# Patient Record
Sex: FEMALE | Race: WHITE | NOT HISPANIC OR LATINO | Employment: FULL TIME | URBAN - METROPOLITAN AREA
[De-identification: names, ages, dates, MRNs, and addresses within clinical notes are randomized per-mention and may not be internally consistent; named-entity substitution may affect disease eponyms.]

---

## 2017-02-28 ENCOUNTER — HOSPITAL ENCOUNTER (EMERGENCY)
Facility: HOSPITAL | Age: 27
End: 2017-03-01
Attending: EMERGENCY MEDICINE | Admitting: EMERGENCY MEDICINE
Payer: MEDICARE

## 2017-02-28 VITALS
RESPIRATION RATE: 16 BRPM | HEIGHT: 65 IN | OXYGEN SATURATION: 97 % | DIASTOLIC BLOOD PRESSURE: 66 MMHG | TEMPERATURE: 97.9 F | SYSTOLIC BLOOD PRESSURE: 104 MMHG | HEART RATE: 82 BPM

## 2017-02-28 DIAGNOSIS — F32.A DEPRESSION: Primary | ICD-10-CM

## 2017-02-28 DIAGNOSIS — R45.851 SUICIDAL IDEATION: ICD-10-CM

## 2017-02-28 LAB
ETHANOL EXG-MCNC: 0 MG/DL
HCG UR QL: NEGATIVE

## 2017-02-28 PROCEDURE — 82075 ASSAY OF BREATH ETHANOL: CPT | Performed by: EMERGENCY MEDICINE

## 2017-02-28 PROCEDURE — 80307 DRUG TEST PRSMV CHEM ANLYZR: CPT | Performed by: EMERGENCY MEDICINE

## 2017-02-28 PROCEDURE — 81025 URINE PREGNANCY TEST: CPT | Performed by: EMERGENCY MEDICINE

## 2017-03-01 PROCEDURE — 99285 EMERGENCY DEPT VISIT HI MDM: CPT

## 2017-03-01 RX ORDER — LORAZEPAM 0.5 MG/1
1 TABLET ORAL ONCE
Status: COMPLETED | OUTPATIENT
Start: 2017-03-01 | End: 2017-03-01

## 2017-03-01 RX ADMIN — LORAZEPAM 1 MG: 0.5 TABLET ORAL at 00:21

## 2017-03-03 LAB
AMPHETAMINES UR QL SCN: NEGATIVE NG/ML
BARBITURATES UR QL SCN: NEGATIVE NG/ML
BENZODIAZ UR QL SCN: NEGATIVE NG/ML
BZE UR QL SCN: NEGATIVE NG/ML
CANNABINOIDS UR QL SCN: NEGATIVE NG/ML
METHADONE UR QL SCN: NEGATIVE NG/ML
OPIATES UR QL: POSITIVE
PCP UR QL: NEGATIVE NG/ML
PROPOXYPH UR QL: NEGATIVE NG/ML

## 2017-11-06 ENCOUNTER — APPOINTMENT (EMERGENCY)
Dept: CT IMAGING | Facility: HOSPITAL | Age: 27
End: 2017-11-06

## 2017-11-06 ENCOUNTER — HOSPITAL ENCOUNTER (OUTPATIENT)
Facility: HOSPITAL | Age: 27
Setting detail: OBSERVATION
Discharge: HOME | End: 2017-11-07
Attending: EMERGENCY MEDICINE | Admitting: SURGERY

## 2017-11-06 ENCOUNTER — APPOINTMENT (OUTPATIENT)
Dept: RADIOLOGY | Facility: HOSPITAL | Age: 27
End: 2017-11-06
Attending: SURGERY

## 2017-11-06 DIAGNOSIS — I95.9 TRANSIENT HYPOTENSION: ICD-10-CM

## 2017-11-06 DIAGNOSIS — R10.11 COLICKY RUQ ABDOMINAL PAIN: ICD-10-CM

## 2017-11-06 DIAGNOSIS — D72.829 LEUKOCYTOSIS: ICD-10-CM

## 2017-11-06 DIAGNOSIS — K81.0 ACUTE CHOLECYSTITIS: Primary | ICD-10-CM

## 2017-11-06 LAB
ALBUMIN SERPL BCP-MCNC: 3.7 G/DL (ref 3.5–5)
ALP SERPL-CCNC: 32 U/L (ref 46–116)
ALT SERPL W P-5'-P-CCNC: 11 U/L (ref 12–78)
ANION GAP SERPL CALCULATED.3IONS-SCNC: 10 MMOL/L (ref 4–13)
AST SERPL W P-5'-P-CCNC: 9 U/L (ref 5–45)
BASOPHILS # BLD AUTO: 0.04 THOUSANDS/ΜL (ref 0–0.1)
BASOPHILS NFR BLD AUTO: 0 % (ref 0–1)
BILIRUB SERPL-MCNC: 0.3 MG/DL (ref 0.2–1)
BUN SERPL-MCNC: 13 MG/DL (ref 5–25)
CALCIUM SERPL-MCNC: 8.5 MG/DL (ref 8.3–10.1)
CHLORIDE SERPL-SCNC: 105 MMOL/L (ref 100–108)
CLARITY, POC: CLEAR
CO2 SERPL-SCNC: 26 MMOL/L (ref 21–32)
COLOR, POC: YELLOW
CREAT SERPL-MCNC: 0.75 MG/DL (ref 0.6–1.3)
EOSINOPHIL # BLD AUTO: 0.78 THOUSAND/ΜL (ref 0–0.61)
EOSINOPHIL NFR BLD AUTO: 7 % (ref 0–6)
ERYTHROCYTE [DISTWIDTH] IN BLOOD BY AUTOMATED COUNT: 13.9 % (ref 11.6–15.1)
EXT BILIRUBIN, UA: NEGATIVE
EXT BLOOD URINE: NEGATIVE
EXT GLUCOSE, UA: NEGATIVE
EXT KETONES: NEGATIVE
EXT NITRITE, UA: NEGATIVE
EXT PH, UA: 6
EXT PREG TEST URINE: NEGATIVE
EXT PROTEIN, UA: NORMAL
EXT SPECIFIC GRAVITY, UA: 1.02
EXT UROBILINOGEN: 0.2
GFR SERPL CREATININE-BSD FRML MDRD: 109 ML/MIN/1.73SQ M
GLUCOSE SERPL-MCNC: 94 MG/DL (ref 65–140)
HCT VFR BLD AUTO: 38.2 % (ref 34.8–46.1)
HGB BLD-MCNC: 12.4 G/DL (ref 11.5–15.4)
LIPASE SERPL-CCNC: 90 U/L (ref 73–393)
LYMPHOCYTES # BLD AUTO: 2.74 THOUSANDS/ΜL (ref 0.6–4.47)
LYMPHOCYTES NFR BLD AUTO: 23 % (ref 14–44)
MCH RBC QN AUTO: 30 PG (ref 26.8–34.3)
MCHC RBC AUTO-ENTMCNC: 32.5 G/DL (ref 31.4–37.4)
MCV RBC AUTO: 93 FL (ref 82–98)
MONOCYTES # BLD AUTO: 0.8 THOUSAND/ΜL (ref 0.17–1.22)
MONOCYTES NFR BLD AUTO: 7 % (ref 4–12)
NEUTROPHILS # BLD AUTO: 7.34 THOUSANDS/ΜL (ref 1.85–7.62)
NEUTS SEG NFR BLD AUTO: 63 % (ref 43–75)
PLATELET # BLD AUTO: 345 THOUSANDS/UL (ref 149–390)
PMV BLD AUTO: 11.2 FL (ref 8.9–12.7)
POTASSIUM SERPL-SCNC: 3.6 MMOL/L (ref 3.5–5.3)
PROT SERPL-MCNC: 6.8 G/DL (ref 6.4–8.2)
RBC # BLD AUTO: 4.13 MILLION/UL (ref 3.81–5.12)
SODIUM SERPL-SCNC: 141 MMOL/L (ref 136–145)
WBC # BLD AUTO: 11.7 THOUSAND/UL (ref 4.31–10.16)
WBC # BLD EST: NEGATIVE 10*3/UL

## 2017-11-06 PROCEDURE — 81025 URINE PREGNANCY TEST: CPT | Performed by: EMERGENCY MEDICINE

## 2017-11-06 PROCEDURE — 85025 COMPLETE CBC W/AUTO DIFF WBC: CPT | Performed by: EMERGENCY MEDICINE

## 2017-11-06 PROCEDURE — 99285 EMERGENCY DEPT VISIT HI MDM: CPT

## 2017-11-06 PROCEDURE — C1769 GUIDE WIRE: HCPCS

## 2017-11-06 PROCEDURE — 36415 COLL VENOUS BLD VENIPUNCTURE: CPT | Performed by: EMERGENCY MEDICINE

## 2017-11-06 PROCEDURE — 47490 INCISION OF GALLBLADDER: CPT

## 2017-11-06 PROCEDURE — C1729 CATH, DRAINAGE: HCPCS

## 2017-11-06 PROCEDURE — 96361 HYDRATE IV INFUSION ADD-ON: CPT

## 2017-11-06 PROCEDURE — 74177 CT ABD & PELVIS W/CONTRAST: CPT

## 2017-11-06 PROCEDURE — 83690 ASSAY OF LIPASE: CPT | Performed by: EMERGENCY MEDICINE

## 2017-11-06 PROCEDURE — 87205 SMEAR GRAM STAIN: CPT | Performed by: SURGERY

## 2017-11-06 PROCEDURE — 96375 TX/PRO/DX INJ NEW DRUG ADDON: CPT

## 2017-11-06 PROCEDURE — 96374 THER/PROPH/DIAG INJ IV PUSH: CPT

## 2017-11-06 PROCEDURE — 87070 CULTURE OTHR SPECIMN AEROBIC: CPT | Performed by: SURGERY

## 2017-11-06 PROCEDURE — 81002 URINALYSIS NONAUTO W/O SCOPE: CPT | Performed by: EMERGENCY MEDICINE

## 2017-11-06 PROCEDURE — 80053 COMPREHEN METABOLIC PANEL: CPT | Performed by: EMERGENCY MEDICINE

## 2017-11-06 PROCEDURE — C9113 INJ PANTOPRAZOLE SODIUM, VIA: HCPCS | Performed by: SURGERY

## 2017-11-06 RX ORDER — ONDANSETRON 2 MG/ML
4 INJECTION INTRAMUSCULAR; INTRAVENOUS ONCE
Status: COMPLETED | OUTPATIENT
Start: 2017-11-06 | End: 2017-11-06

## 2017-11-06 RX ORDER — DEXTROSE AND SODIUM CHLORIDE 5; .45 G/100ML; G/100ML
125 INJECTION, SOLUTION INTRAVENOUS CONTINUOUS
Status: DISCONTINUED | OUTPATIENT
Start: 2017-11-06 | End: 2017-11-07

## 2017-11-06 RX ORDER — KETOROLAC TROMETHAMINE 30 MG/ML
30 INJECTION, SOLUTION INTRAMUSCULAR; INTRAVENOUS ONCE
Status: COMPLETED | OUTPATIENT
Start: 2017-11-06 | End: 2017-11-06

## 2017-11-06 RX ORDER — ONDANSETRON 2 MG/ML
4 INJECTION INTRAMUSCULAR; INTRAVENOUS EVERY 4 HOURS PRN
Status: DISCONTINUED | OUTPATIENT
Start: 2017-11-06 | End: 2017-11-06

## 2017-11-06 RX ORDER — ONDANSETRON 2 MG/ML
4 INJECTION INTRAMUSCULAR; INTRAVENOUS EVERY 4 HOURS PRN
Status: DISCONTINUED | OUTPATIENT
Start: 2017-11-06 | End: 2017-11-07 | Stop reason: HOSPADM

## 2017-11-06 RX ORDER — LEVOFLOXACIN 5 MG/ML
750 INJECTION, SOLUTION INTRAVENOUS EVERY 24 HOURS
Status: DISCONTINUED | OUTPATIENT
Start: 2017-11-06 | End: 2017-11-07 | Stop reason: HOSPADM

## 2017-11-06 RX ORDER — MORPHINE SULFATE 2 MG/ML
2 INJECTION, SOLUTION INTRAMUSCULAR; INTRAVENOUS EVERY 2 HOUR PRN
Status: DISCONTINUED | OUTPATIENT
Start: 2017-11-06 | End: 2017-11-07 | Stop reason: HOSPADM

## 2017-11-06 RX ORDER — DIPHENHYDRAMINE HYDROCHLORIDE 50 MG/ML
INJECTION INTRAMUSCULAR; INTRAVENOUS CODE/TRAUMA/SEDATION MEDICATION
Status: COMPLETED | OUTPATIENT
Start: 2017-11-06 | End: 2017-11-06

## 2017-11-06 RX ORDER — ONDANSETRON 2 MG/ML
4 INJECTION INTRAMUSCULAR; INTRAVENOUS ONCE AS NEEDED
Status: DISCONTINUED | OUTPATIENT
Start: 2017-11-06 | End: 2017-11-07 | Stop reason: HOSPADM

## 2017-11-06 RX ORDER — NICOTINE 21 MG/24HR
14 PATCH, TRANSDERMAL 24 HOURS TRANSDERMAL DAILY
Status: DISCONTINUED | OUTPATIENT
Start: 2017-11-06 | End: 2017-11-07 | Stop reason: HOSPADM

## 2017-11-06 RX ORDER — SODIUM CHLORIDE 9 MG/ML
125 INJECTION, SOLUTION INTRAVENOUS CONTINUOUS
Status: DISCONTINUED | OUTPATIENT
Start: 2017-11-06 | End: 2017-11-07

## 2017-11-06 RX ORDER — PANTOPRAZOLE SODIUM 40 MG/1
40 INJECTION, POWDER, FOR SOLUTION INTRAVENOUS DAILY
Status: DISCONTINUED | OUTPATIENT
Start: 2017-11-06 | End: 2017-11-07 | Stop reason: HOSPADM

## 2017-11-06 RX ORDER — KETOROLAC TROMETHAMINE 30 MG/ML
15 INJECTION, SOLUTION INTRAMUSCULAR; INTRAVENOUS EVERY 6 HOURS SCHEDULED
Status: DISCONTINUED | OUTPATIENT
Start: 2017-11-06 | End: 2017-11-07 | Stop reason: HOSPADM

## 2017-11-06 RX ORDER — CLINDAMYCIN PHOSPHATE 600 MG/50ML
600 INJECTION INTRAVENOUS EVERY 8 HOURS
Status: DISCONTINUED | OUTPATIENT
Start: 2017-11-06 | End: 2017-11-06

## 2017-11-06 RX ORDER — KETOROLAC TROMETHAMINE 30 MG/ML
INJECTION, SOLUTION INTRAMUSCULAR; INTRAVENOUS CODE/TRAUMA/SEDATION MEDICATION
Status: COMPLETED | OUTPATIENT
Start: 2017-11-06 | End: 2017-11-06

## 2017-11-06 RX ADMIN — METRONIDAZOLE 500 MG: 500 INJECTION, SOLUTION INTRAVENOUS at 17:24

## 2017-11-06 RX ADMIN — CEFAZOLIN SODIUM 1000 MG: 1 SOLUTION INTRAVENOUS at 04:10

## 2017-11-06 RX ADMIN — SODIUM CHLORIDE 1000 ML: 0.9 INJECTION, SOLUTION INTRAVENOUS at 01:12

## 2017-11-06 RX ADMIN — IOHEXOL 100 ML: 350 INJECTION, SOLUTION INTRAVENOUS at 02:15

## 2017-11-06 RX ADMIN — SODIUM CHLORIDE 1000 ML: 0.9 INJECTION, SOLUTION INTRAVENOUS at 02:15

## 2017-11-06 RX ADMIN — IOHEXOL 100 ML: 350 INJECTION, SOLUTION INTRAVENOUS at 03:39

## 2017-11-06 RX ADMIN — PANTOPRAZOLE SODIUM 40 MG: 40 INJECTION, POWDER, FOR SOLUTION INTRAVENOUS at 09:55

## 2017-11-06 RX ADMIN — DIPHENHYDRAMINE HYDROCHLORIDE 50 MG: 50 INJECTION INTRAMUSCULAR; INTRAVENOUS at 11:42

## 2017-11-06 RX ADMIN — SODIUM CHLORIDE 1000 ML: 0.9 INJECTION, SOLUTION INTRAVENOUS at 07:27

## 2017-11-06 RX ADMIN — KETOROLAC TROMETHAMINE 15 MG: 30 INJECTION, SOLUTION INTRAMUSCULAR at 17:27

## 2017-11-06 RX ADMIN — SODIUM CHLORIDE 125 ML/HR: 0.9 INJECTION, SOLUTION INTRAVENOUS at 04:12

## 2017-11-06 RX ADMIN — LEVOFLOXACIN 750 MG: 5 INJECTION, SOLUTION INTRAVENOUS at 12:30

## 2017-11-06 RX ADMIN — NICOTINE 14 MG: 14 PATCH TRANSDERMAL at 09:55

## 2017-11-06 RX ADMIN — IOHEXOL 5 ML: 300 INJECTION, SOLUTION INTRAVENOUS at 12:28

## 2017-11-06 RX ADMIN — KETOROLAC TROMETHAMINE 15 MG: 30 INJECTION, SOLUTION INTRAMUSCULAR at 12:06

## 2017-11-06 RX ADMIN — ONDANSETRON 4 MG: 2 INJECTION INTRAMUSCULAR; INTRAVENOUS at 01:39

## 2017-11-06 RX ADMIN — DEXTROSE AND SODIUM CHLORIDE 125 ML/HR: 5; 450 INJECTION, SOLUTION INTRAVENOUS at 09:07

## 2017-11-06 RX ADMIN — METRONIDAZOLE 500 MG: 500 INJECTION, SOLUTION INTRAVENOUS at 09:55

## 2017-11-06 RX ADMIN — ONDANSETRON 4 MG: 2 INJECTION INTRAMUSCULAR; INTRAVENOUS at 01:12

## 2017-11-06 RX ADMIN — FAMOTIDINE 20 MG: 10 INJECTION INTRAVENOUS at 01:13

## 2017-11-06 RX ADMIN — KETOROLAC TROMETHAMINE 30 MG: 30 INJECTION, SOLUTION INTRAMUSCULAR at 01:13

## 2017-11-06 NOTE — PLAN OF CARE
INFECTION - ADULT     Absence or prevention of progression during hospitalization Progressing     Absence of fever/infection during neutropenic period Progressing        PAIN - ADULT     Verbalizes/displays adequate comfort level or baseline comfort level Progressing

## 2017-11-06 NOTE — ED PROVIDER NOTES
History  Chief Complaint   Patient presents with    Abdominal Pain     Pt to ED with abdominal pain bilaterally  Vomit x 3 today, normal bowel movements, urgency to urinate, small amounts coming out  27 yo female who presents with upper abdominal colicky pain on and off for 2 weeks  No correlation with food  Denies fever/chills, no diarrhea or constipation  Does report episodes of worsening pain yesterday and today with associated nausea and vomiting  History provided by:  Patient and parent   used: No    Abdominal Pain   Pain location:  RUQ and LUQ  Pain quality: aching    Pain radiates to:  Back  Pain severity:  Moderate  Onset quality:  Gradual  Duration:  2 weeks  Timing:  Intermittent  Progression:  Worsening  Chronicity:  New  Relieved by:  Nothing  Worsened by:  Nothing  Ineffective treatments:  None tried  Associated symptoms: nausea (yesterday and today with severe pain) and vomiting (yesterday and today with severe pain)    Associated symptoms: no chest pain, no chills, no diarrhea, no dysuria, no fatigue, no fever, no hematuria, no shortness of breath, no sore throat, no vaginal bleeding and no vaginal discharge    Associated symptoms comment:  Some urinary frequency   Risk factors: has not had multiple surgeries        Prior to Admission Medications   Prescriptions Last Dose Informant Patient Reported? Taking? Naltrexone (VIVITROL IM)   Yes Yes   Sig: Inject 300 mg into the shoulder, thigh, or buttocks      Facility-Administered Medications: None       Past Medical History:   Diagnosis Date    Disease of thyroid gland     hypoactive    Hepatitis C     Sciatica        Past Surgical History:   Procedure Laterality Date    KNEE ARTHROSCOPY W/ ACL RECONSTRUCTION Right        History reviewed  No pertinent family history  I have reviewed and agree with the history as documented      Social History   Substance Use Topics    Smoking status: Current Every Day Smoker  Smokeless tobacco: Never Used    Alcohol use No        Review of Systems   Constitutional: Negative for appetite change, chills, fatigue and fever  HENT: Negative for congestion and sore throat  Eyes: Negative for visual disturbance  Respiratory: Negative for shortness of breath  Cardiovascular: Negative for chest pain  Gastrointestinal: Positive for abdominal pain (upper abd R>L ), nausea (yesterday and today with severe pain) and vomiting (yesterday and today with severe pain)  Negative for diarrhea  Genitourinary: Positive for frequency  Negative for dysuria, hematuria, vaginal bleeding and vaginal discharge  Musculoskeletal: Negative for back pain (mid back ache), neck pain and neck stiffness  Skin: Negative for pallor and rash  Allergic/Immunologic: Negative for immunocompromised state  Neurological: Negative for light-headedness and headaches  Psychiatric/Behavioral: Negative for confusion  All other systems reviewed and are negative  Physical Exam  ED Triage Vitals [11/06/17 0032]   Temperature Pulse Respirations Blood Pressure SpO2   (!) 97 4 °F (36 3 °C) 77 16 122/59 96 %      Temp Source Heart Rate Source Patient Position - Orthostatic VS BP Location FiO2 (%)   Tympanic Monitor Sitting Right arm --      Pain Score       8           Orthostatic Vital Signs  Vitals:    11/06/17 0315 11/06/17 0330 11/06/17 0400 11/06/17 0415   BP: (!) 78/46 (!) 79/50 95/57 98/61   Pulse: 55  57 59   Patient Position - Orthostatic VS: Lying Lying Lying Lying       Physical Exam   Constitutional: She is oriented to person, place, and time  She appears well-developed and well-nourished  No distress  HENT:   Head: Normocephalic and atraumatic  Right Ear: External ear normal    Left Ear: External ear normal    Mouth/Throat: Oropharynx is clear and moist    Eyes: EOM are normal    Neck: Neck supple  Cardiovascular: Normal rate and regular rhythm  No murmur heard    Pulmonary/Chest: Effort normal and breath sounds normal  No respiratory distress  Abdominal: Soft  Bowel sounds are normal  There is tenderness (mild diffuse upper abd pain, R>L per pt - neg House's)  Musculoskeletal: Normal range of motion  Neurological: She is alert and oriented to person, place, and time  Skin: Skin is warm  No rash noted  No pallor  Psychiatric: She has a normal mood and affect  Her behavior is normal    Nursing note and vitals reviewed        ED Medications  Medications   sodium chloride 0 9 % infusion (125 mL/hr Intravenous New Bag 11/6/17 0412)   ceFAZolin (ANCEF) IVPB (premix) 1,000 mg (1,000 mg Intravenous New Bag 11/6/17 0410)   sodium chloride 0 9 % bolus 1,000 mL (0 mL Intravenous Stopped 11/6/17 0216)   ondansetron (ZOFRAN) injection 4 mg (4 mg Intravenous Given 11/6/17 0112)   ketorolac (TORADOL) 30 mg/mL injection 30 mg (30 mg Intravenous Given 11/6/17 0113)   famotidine (PEPCID) injection 20 mg (20 mg Intravenous Given 11/6/17 0113)   ondansetron (ZOFRAN) injection 4 mg (4 mg Intravenous Given 11/6/17 0139)   sodium chloride 0 9 % bolus 1,000 mL (0 mL Intravenous Stopped 11/6/17 0322)   iohexol (OMNIPAQUE) 350 MG/ML injection (MULTI-DOSE) 100 mL (100 mL Intravenous Given 11/6/17 0215)   iohexol (OMNIPAQUE) 350 MG/ML injection (SINGLE-DOSE) 100 mL (100 mL Intravenous Given 11/6/17 0339)       Diagnostic Studies  Results Reviewed     Procedure Component Value Units Date/Time    Comprehensive metabolic panel [[id number]]  (Abnormal) Collected:  11/06/17 0108    Lab Status:  Final result Specimen:  Blood from Arm, Right Updated:  11/06/17 0139     Sodium 141 mmol/L      Potassium 3 6 mmol/L      Chloride 105 mmol/L      CO2 26 mmol/L      Anion Gap 10 mmol/L      BUN 13 mg/dL      Creatinine 0 75 mg/dL      Glucose 94 mg/dL      Calcium 8 5 mg/dL      AST 9 U/L      ALT 11 (L) U/L      Alkaline Phosphatase 32 (L) U/L      Total Protein 6 8 g/dL      Albumin 3 7 g/dL      Total Bilirubin 0 30 mg/dL eGFR 109 ml/min/1 73sq m     Narrative:         National Kidney Disease Education Program recommendations are as follows:  GFR calculation is accurate only with a steady state creatinine  Chronic Kidney disease less than 60 ml/min/1 73 sq  meters  Kidney failure less than 15 ml/min/1 73 sq  meters      Lipase [[id number]]  (Normal) Collected:  11/06/17 0108    Lab Status:  Final result Specimen:  Blood from Arm, Right Updated:  11/06/17 0139     Lipase 90 u/L     CBC and differential [[id number]]  (Abnormal) Collected:  11/06/17 0108    Lab Status:  Final result Specimen:  Blood from Arm, Right Updated:  11/06/17 0121     WBC 11 70 (H) Thousand/uL      RBC 4 13 Million/uL      Hemoglobin 12 4 g/dL      Hematocrit 38 2 %      MCV 93 fL      MCH 30 0 pg      MCHC 32 5 g/dL      RDW 13 9 %      MPV 11 2 fL      Platelets 299 Thousands/uL      Neutrophils Relative 63 %      Lymphocytes Relative 23 %      Monocytes Relative 7 %      Eosinophils Relative 7 (H) %      Basophils Relative 0 %      Neutrophils Absolute 7 34 Thousands/µL      Lymphocytes Absolute 2 74 Thousands/µL      Monocytes Absolute 0 80 Thousand/µL      Eosinophils Absolute 0 78 (H) Thousand/µL      Basophils Absolute 0 04 Thousands/µL     POCT pregnancy, urine [[id number]]  (Normal) Resulted:  11/06/17 0040    Lab Status:  Final result Updated:  11/06/17 0048     EXT PREG TEST UR (Ref: Negative) NEGATIVE    POCT urinalysis dipstick [[id number]]  (Normal) Resulted:  11/06/17 0040    Lab Status:  Final result Specimen:  Urine Updated:  11/06/17 0047     Color, UA YELLOW     Clarity, UA CLEAR     EXT Glucose, UA NEGATIVE     EXT Bilirubin, UA (Ref: Negative) NEGATIVE     EXT Ketones, UA (Ref: Negative) NEGATIVE     EXT Spec Grav, UA 1 025     EXT Blood, UA (Ref: Negative) NEGATIVE     EXT pH, UA 6 0     EXT Protein, UA (Ref: Negative) TRACE     EXT Urobilinogen, UA (Ref: 0 2- 1 0) 0 2     EXT Leukocytes, UA (Ref: Negative) NEGATIVE     EXT Nitrite, UA (Ref: Negative) NEGATIVE                 CT abdomen pelvis with contrast    (Results Pending)   CT abdomen pelvis with contrast    (Results Pending)              Procedures  Procedures       Phone Contacts  ED Phone Contact    ED Course  ED Course as of Nov 06 0430   Mon Nov 06, 2017   0029 Pt seen and examined  29 yo female who presents with upper abdominal colicky pain on and off for 2 weeks  No correlation with food  Denies fever/chills, no diarrhea or constipation  Does report episodes of worsening pain yesterday and today with associated nausea and vomiting  Will give IVF, zofran, pepcid and toradol  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 - requests NO NARCOTICS  Will allso check labs and CT a/p     0123 WBC 11 7     0137 Pain down to 2/10, still nauseas  IVF infusing - will repeat dose of zofran  0143 LFT's WNL, lipase 90     0205 Pt hypotensive - 2nd L IVF ordered  Pt resting comfortably, awaiting CT a/p     0323 Pt taken for CT a/p     0405 CT shows Cholelithiasis noted  The gallbladder is distended, thickwalled with pericholecystic edema, findings compatible with cholecystitis    Reviewed findings and discussed case with Dr Browne Late - will keep NPO, start ancef and obs pt - they will decide on lap lucy vs perc lucy tube this am                                 MDM  CritCare Time    Disposition  Final diagnoses:   Acute cholecystitis   Leukocytosis   Colicky RUQ abdominal pain   Transient hypotension     Time reflects when diagnosis was documented in both MDM as applicable and the Disposition within this note     Time User Action Codes Description Comment    11/6/2017  4:04 AM Blanca MORENO Add [K81 0] Acute cholecystitis     11/6/2017  4:04 AM Blanca MORENO Add [D72 829] Leukocytosis     11/6/2017  4:04 AM Blanca March M Add [C26 75] Colicky RUQ abdominal pain     11/6/2017  4:05 AM Luz Maria Johnson Add [I95 9] Transient hypotension       ED Disposition     ED Disposition Condition Comment    Admit Case was discussed with Dr Jeff Saucedo and the patient's admission status was agreed to be Admission Status: observation status to the service of Dr Jeff Saucedo   Follow-up Information    None       Patient's Medications   Discharge Prescriptions    No medications on file     No discharge procedures on file      ED Provider  Electronically Signed by           Fuad Bryant DO  11/06/17 0430

## 2017-11-06 NOTE — CASE MANAGEMENT
Initial Clinical Review    Admission: Date/Time/Statement: 11/6/2017  0405 OBSERVATION    Orders Placed This Encounter   Procedures    Place in Observation (expected length of stay for this patient is less than two midnights)     Standing Status:   Standing     Number of Occurrences:   1     Order Specific Question:   Admitting Physician     Answer:   Jenaro Wiley [[id number]]     Order Specific Question:   Level of Care     Answer:   Med Surg [16]         ED: Date/Time/Mode of Arrival:   ED Arrival Information     Expected Arrival Acuity Means of Arrival Escorted By Service Admission Type    - 11/6/2017 00:12 Urgent Walk-In Family Member Surgery-General Urgent    Arrival Complaint    abdominal pain          Chief Complaint:   Chief Complaint   Patient presents with    Abdominal Pain     Pt to ED with abdominal pain bilaterally  Vomit x 3 today, normal bowel movements, urgency to urinate, small amounts coming out  History of Illness: 29 yo female who presents with upper abdominal colicky pain on and off for 2 weeks  No correlation with food  Denies fever/chills, no diarrhea or constipation  Does report episodes of worsening pain yesterday and today with associated nausea and vomiting  In ED hypotensive and bolus given ivf    ED Vital Signs:   ED Triage Vitals [11/06/17 0032]   Temperature Pulse Respirations Blood Pressure SpO2   (!) 97 4 °F (36 3 °C) 77 16 122/59 96 %      Temp Source Heart Rate Source Patient Position - Orthostatic VS BP Location FiO2 (%)   Tympanic Monitor Sitting Right arm --      Pain Score       8        Wt Readings from Last 1 Encounters:   11/06/17 77 7 kg (171 lb 4 8 oz)       Vital Signs (abnormal): temperature 97 4  BP low of 77/48  Pulse low of 52  Exam - mild diffuse tenderness upper abd pain R>L     Abnormal Labs/Diagnostic Test Results: Alt 11  Alkaline phosphatase 32  Wbc 11 70    Ct abdomen - Cholelithiasis, with acute cholecystitis  Labs 11/7- hgb 11 2     Cl 110   Bun 4   Glucose 100  Calcium 7 5  Alkaline phosphatase 25  Total protein 5 4  Albumin 2 9    ED Treatment:   Medication Administration from 11/06/2017 0012 to 11/06/2017 1752       Date/Time Order Dose Route Action Action by Comments     11/06/2017 0216 sodium chloride 0 9 % bolus 1,000 mL 0 mL Intravenous Stopped Belkis Leach RN      11/06/2017 0112 sodium chloride 0 9 % bolus 1,000 mL 1,000 mL Intravenous [address] Belkis Leach RN      11/06/2017 0112 ondansetron (ZOFRAN) injection 4 mg 4 mg Intravenous Given Belkis Leach RN      11/06/2017 0113 ketorolac (TORADOL) 30 mg/mL injection 30 mg 30 mg Intravenous Given Belkis Leach RN      11/06/2017 0113 famotidine (PEPCID) injection 20 mg 20 mg Intravenous Given Belkis Leach RN      11/06/2017 0139 ondansetron (ZOFRAN) injection 4 mg 4 mg Intravenous Given Belkis Leach RN      11/06/2017 0322 sodium chloride 0 9 % bolus 1,000 mL 0 mL Intravenous Stopped Belkis Leach RN      11/06/2017 0215 sodium chloride 0 9 % bolus 1,000 mL 1,000 mL Intravenous [address] Belkis Leach RN      11/06/2017 0215 iohexol (OMNIPAQUE) 350 MG/ML injection (MULTI-DOSE) 100 mL 100 mL Intravenous Given Jett Mendoza      11/06/2017 0339 iohexol (OMNIPAQUE) 350 MG/ML injection (SINGLE-DOSE) 100 mL 100 mL Intravenous Given [facility]      11/06/2017 0726 sodium chloride 0 9 % infusion 0 mL/hr Intravenous Stopped Nel Lee RN      11/06/2017 0412 sodium chloride 0 9 % infusion 125 mL/hr Intravenous [address] Belkis Leach RN      11/06/2017 0440 ceFAZolin (ANCEF) IVPB (premix) 1,000 mg 0 mg Intravenous Stopped Belkis Leach RN      11/06/2017 0410 ceFAZolin (ANCEF) IVPB (premix) 1,000 mg 1,000 mg Intravenous [address] Belkis Leach RN      11/06/2017 0440 sodium chloride 0 9 % bolus 1,000 mL 1,000 mL Intravenous New Bag Nel Lee RN           Past Medical/Surgical History:    Active Ambulatory Problems     Diagnosis Date Noted    No Active Ambulatory Problems Resolved Ambulatory Problems     Diagnosis Date Noted    No Resolved Ambulatory Problems     Past Medical History:   Diagnosis Date    Disease of thyroid gland     Hepatitis C     Sciatica        Admitting Diagnosis: Acute cholecystitis [K81 0]  Leukocytosis [D72 829]  Abdominal pain [S11 1]  Colicky RUQ abdominal pain [R10 11]  Transient hypotension [I95 9]    Age/Sex: 28 y o  female    Assessment/Plan: Pt with acute cholecystitis  Non toxic perER physician, Dr Rojelio Palafox  Two week history of symptoms  Pt to be admitted, WBC slightly elevated  Will admit IVF and antibiotics  CT findings c/w severe cholecystitis  Admission Orders:  11/6/2017  0405 OBSERVATION  Scheduled Meds:   levofloxacin 750 mg Intravenous Q24H   metroNIDAZOLE 500 mg Intravenous Q8H   nicotine 14 mg Transdermal Daily   pantoprazole 40 mg Intravenous Daily     Continuous Infusions:   dextrose 5 % and sodium chloride 0 45 % 125 mL/hr Last Rate: 125 mL/hr (11/06/17 0907)   sodium chloride 125 mL/hr Last Rate: Stopped (11/06/17 0726)     PRN Meds: : HYDROmorphone    influenza vaccine    morphine injection    ondansetron    Ondansetron  toradol 30 mg iv - used x 1  OTHER ORDERS:  Consult IR  Ct abdomen   NPO    IR 11/6 - cholecystomy tube placement       [address] [facility] in the [facility] by Hitesh Lyon for 2017  Network Utilization Review Department  Phone: [phone]; Fax [phone]  ATTENTION: The Network Utilization Review Department is now centralized for our 7 Facilities  Make a note that we have a new phone and fax numbers for our Department  Please call with any questions or concerns to [phone] and carefully follow the prompts so that you are directed to the right person  All voicemails are confidential  Fax any determinations, approvals, denials, and requests for initial or continue stay review clinical to [phone]   Due to HIGH CALL volume, it would be easier if you could please send faxed requests to expedite your requests and in part, help us provide discharge notifications faster

## 2017-11-06 NOTE — BRIEF OP NOTE (RAD/CATH)
Cholecystostomy Placement Procedure Note    PATIENT NAME: Sandra Barcenas  : 1990  MRN: [id number]     Pre-op Diagnosis:   1  Acute cholecystitis    2  Leukocytosis    3  Colicky RUQ abdominal pain    4  Transient hypotension      Post-op Diagnosis:   1  Acute cholecystitis    2  Leukocytosis    3  Colicky RUQ abdominal pain    4  Transient hypotension        Surgeon:   Cayden Guthrie DO  Assistants:     No qualified resident was available  Estimated Blood Loss: None  Findings: US guided percutaneous subcostal intrahepatic approach 8F cholecystostomy tube placed via US guidance  Patient experienced a fair amount of pain  Placement confirmed with pre and post contrast injection spot films  Specimens: Gallbladder bile  Complications:  None apparent      Anesthesia: Local    Cayden Guthrie DO     Date: 2017  Time: 12:26 PM

## 2017-11-06 NOTE — TREATMENT PLAN
Pt with acute cholecystitis  Non toxic perER physician, Dr Lilly Wilkinson  Two week history of symptoms  Pt to be admitte normal, WBC slightly elevated  Will admit IVF and antibiotics  CT findings c/w severe cholecystitis

## 2017-11-06 NOTE — ED NOTES
Pt hypostensive at 75/42, provider notified, second liter bolus of normal saline ordered         Mehdi Mary RN  11/06/17 4242

## 2017-11-06 NOTE — H&P
H&P Exam - General Surgery   Tricia Patel 28 y o  female MRN: [id number]  Unit/Bed#: [address] [place] [address] 210-02 Encounter: [id number]    Assessment/Plan     Assessment:  · Cholelithiasis with acute cholecystitis   · Tobacco dependence   · Mild leukocytosis   · On Vivitrol for treatment of opioid dependence   · Hep C  · Hypothyroid    Plan:  · Will consult IR for cholecystostomy tube placement  in light of patient's 2 week h/o GB symptoms and CT scan findings- IR notified- plan for today  · Cholecystectomy would be indicated after acute cholecystitis and GB inflammation resolved- approximately 6-8 weeks  · Pain control- discussed with pharmacy- narcotics are not contraindicated but will have decreased efficacy  Have recommended Toradol but will hold off on Toradol until after procedure due to bleeding risk   · Monitor leukocytosis  · Initiate IV antibiotics  · Nicotine patch for tobacco dependence     History of Present Illness   Tricia Patel is a 28 y o  female who presents with abdominal pain x 2 weeks  She states that  her pain is in the RUQ and LUQ with the RUQ pain being greater  She states the pain has been intermittent x 2 weeks and that it has been present every day for the past week  It worsened yesterday after a heavy meal and was accompanied by nausea and vomiting  She presented to the ER late last night and was found to have acute cholecystitis on CT evaluation  Admits to associated nausea and episodes of vomiting  She vomited 3 x prior to coming to the ED yesterday  She also reports fatigue x 2 weeks  She denies constipation or diarrhea, fevers or chills, or recent unintentional weight loss  She does report a h/o episodes of similar pain but not as severe and resolved without intervention  She has no prior h/o abdominal surgery  No sick contacts or recent travel  Currently, her pain is a 4/10 and her nausea is controlled         Review of Systems   Constitutional: Positive for appetite change and fatigue  Negative for chills, diaphoresis, fever and unexpected weight change  HENT: Negative  Eyes: Negative  Respiratory: Negative for cough, chest tightness, shortness of breath and wheezing  Cardiovascular: Negative for chest pain, palpitations and leg swelling  Gastrointestinal: Positive for abdominal pain, nausea and vomiting  Negative for abdominal distention, constipation and diarrhea  Endocrine: Negative  Hypoactive thyroid    Genitourinary: Positive for frequency  Negative for dysuria, flank pain, hematuria and urgency  Musculoskeletal: Positive for back pain (with lower extremity radiculopathy)  Negative for arthralgias, joint swelling and myalgias  Skin: Negative for color change, rash and wound  Allergic/Immunologic: Negative for immunocompromised state  Neurological: Negative  Negative for dizziness, seizures and headaches  Hematological: Negative  Does not bruise/bleed easily  Psychiatric/Behavioral: Positive for sleep disturbance  Negative for agitation and confusion  Historical Information   Past Medical History:   Diagnosis Date    Disease of thyroid gland     hypoactive    Hepatitis C     Sciatica      Past Surgical History:   Procedure Laterality Date    KNEE ARTHROSCOPY W/ ACL RECONSTRUCTION Right      Social History   History   Alcohol Use No     History   Drug Use    Types: Heroin     Comment: Last Use: 3/18/17     History   Smoking Status    Current Every Day Smoker   Smokeless Tobacco    Never Used     Family History: History reviewed  No pertinent family history  Meds/Allergies   PTA meds:   Prior to Admission Medications   Prescriptions Last Dose Informant Patient Reported? Taking?    Naltrexone (VIVITROL IM)   Yes Yes   Sig: Inject 300 mg into the shoulder, thigh, or buttocks      Facility-Administered Medications: None     Allergies   Allergen Reactions    Penicillins Hives       Objective   First Vitals:   Blood Pressure: 122/59 (11/06/17 0032)  Pulse: 77 (11/06/17 0032)  Temperature: (!) 97 4 °F (36 3 °C) (11/06/17 0032)  Temp Source: Tympanic (11/06/17 0032)  Respirations: 16 (11/06/17 0032)  Height: 5' 5" (165 1 cm) (11/06/17 0032)  Weight - Scale: 73 9 kg (163 lb) (11/06/17 0032)  SpO2: 96 % (11/06/17 0032)    Current Vitals:   Blood Pressure: 96/54 (11/06/17 0800)  Pulse: (!) 52 (11/06/17 0800)  Temperature: (!) 97 4 °F (36 3 °C) (11/06/17 0032)  Temp Source: Tympanic (11/06/17 0032)  Respirations: 16 (11/06/17 0800)  Height: 5' 5" (165 1 cm) (11/06/17 0032)  Weight - Scale: 73 9 kg (163 lb) (11/06/17 0032)  SpO2: 98 % (11/06/17 0800)      Intake/Output Summary (Last 24 hours) at 11/06/17 0854  Last data filed at 11/06/17 0216   Gross per 24 hour   Intake             1000 ml   Output                0 ml   Net             1000 ml       Invasive Devices     Peripheral Intravenous Line            Peripheral IV 11/06/17 Right Antecubital less than 1 day                Physical Exam   Constitutional: She is oriented to person, place, and time  She appears well-developed and well-nourished  No distress  HENT:   Head: Normocephalic and atraumatic  Mouth/Throat: Oropharynx is clear and moist  No oropharyngeal exudate  Eyes: Conjunctivae and EOM are normal  Right eye exhibits no discharge  Left eye exhibits no discharge  No scleral icterus  Neck: Neck supple  No JVD present  No tracheal deviation present  Cardiovascular: Normal rate, normal heart sounds and intact distal pulses  Exam reveals no gallop and no friction rub  No murmur heard  Moderately bradycardic   Pulmonary/Chest: Breath sounds normal  No respiratory distress  She has no wheezes  She has no rales  She exhibits no tenderness  Abdominal: Soft  Bowel sounds are normal  She exhibits no distension and no mass  There is tenderness (RUQ > LUQ)  There is no rebound and no guarding  Neg House's sign   Musculoskeletal: Normal range of motion   She exhibits no edema, tenderness or deformity  Neurological: She is alert and oriented to person, place, and time  No focal deficits   Skin: Skin is warm and dry  No rash noted  She is not diaphoretic  No erythema  Psychiatric: She has a normal mood and affect  Her behavior is normal  Judgment and thought content normal        Lab Results:   CBC:   Lab Results   Component Value Date    WBC 11 70 (H) 11/06/2017    HGB 12 4 11/06/2017    HCT 38 2 11/06/2017    MCV 93 11/06/2017     11/06/2017    MCH 30 0 11/06/2017    MCHC 32 5 11/06/2017    RDW 13 9 11/06/2017    MPV 11 2 11/06/2017   , CMP:   Lab Results   Component Value Date     11/06/2017    K 3 6 11/06/2017     11/06/2017    CO2 26 11/06/2017    ANIONGAP 10 11/06/2017    BUN 13 11/06/2017    CREATININE 0 75 11/06/2017    GLUCOSE 94 11/06/2017    CALCIUM 8 5 11/06/2017    AST 9 11/06/2017    ALT 11 (L) 11/06/2017    ALKPHOS 32 (L) 11/06/2017    PROT 6 8 11/06/2017    ALBUMIN 3 7 11/06/2017    BILITOT 0 30 11/06/2017    EGFR 109 11/06/2017   , Urinalysis:   Lab Results   Component Value Date    COLORU YELLOW 11/06/2017    CLARITYU CLEAR 11/06/2017     Imaging: I have personally reviewed pertinent reports  CT ABD/PELV:  LIVER/BILIARY TREE:  There is periportal edema  Liver is otherwise unremarkable  No biliary dilatation  GALLBLADDER:  There are multiple gallstones  There is gallbladder wall thickening and enhancement, and diffuse pericholecystic edema compatible with acute cholecystitis  EKG, Pathology, and Other Studies: I have personally reviewed pertinent reports        Code Status: full  Advance Directive and Living Will:      Power of :    POLST:      Lady Gay PA-C

## 2017-11-07 VITALS
RESPIRATION RATE: 18 BRPM | HEIGHT: 65 IN | BODY MASS INDEX: 28.28 KG/M2 | HEART RATE: 65 BPM | OXYGEN SATURATION: 99 % | WEIGHT: 169.75 LBS | TEMPERATURE: 98.4 F | SYSTOLIC BLOOD PRESSURE: 121 MMHG | DIASTOLIC BLOOD PRESSURE: 58 MMHG

## 2017-11-07 PROBLEM — D72.829 LEUKOCYTOSIS: Status: RESOLVED | Noted: 2017-11-07 | Resolved: 2017-11-07

## 2017-11-07 PROBLEM — R10.11 COLICKY RUQ ABDOMINAL PAIN: Status: ACTIVE | Noted: 2017-11-07

## 2017-11-07 PROBLEM — R10.9 ABDOMINAL PAIN: Status: RESOLVED | Noted: 2017-11-07 | Resolved: 2017-11-07

## 2017-11-07 PROBLEM — I95.9 TRANSIENT HYPOTENSION: Status: ACTIVE | Noted: 2017-11-07

## 2017-11-07 PROBLEM — I95.9 TRANSIENT HYPOTENSION: Status: RESOLVED | Noted: 2017-11-07 | Resolved: 2017-11-07

## 2017-11-07 PROBLEM — R10.9 ABDOMINAL PAIN: Status: ACTIVE | Noted: 2017-11-07

## 2017-11-07 PROBLEM — R10.11 COLICKY RUQ ABDOMINAL PAIN: Status: RESOLVED | Noted: 2017-11-07 | Resolved: 2017-11-07

## 2017-11-07 PROBLEM — D72.829 LEUKOCYTOSIS: Status: ACTIVE | Noted: 2017-11-07

## 2017-11-07 LAB
ALBUMIN SERPL BCP-MCNC: 2.9 G/DL (ref 3.5–5)
ALP SERPL-CCNC: 25 U/L (ref 46–116)
ALT SERPL W P-5'-P-CCNC: 17 U/L (ref 12–78)
ANION GAP SERPL CALCULATED.3IONS-SCNC: 9 MMOL/L (ref 4–13)
AST SERPL W P-5'-P-CCNC: 14 U/L (ref 5–45)
BASOPHILS # BLD AUTO: 0.03 THOUSANDS/ΜL (ref 0–0.1)
BASOPHILS NFR BLD AUTO: 1 % (ref 0–1)
BILIRUB SERPL-MCNC: 0.4 MG/DL (ref 0.2–1)
BUN SERPL-MCNC: 4 MG/DL (ref 5–25)
CALCIUM SERPL-MCNC: 7.5 MG/DL (ref 8.3–10.1)
CHLORIDE SERPL-SCNC: 110 MMOL/L (ref 100–108)
CO2 SERPL-SCNC: 21 MMOL/L (ref 21–32)
CREAT SERPL-MCNC: 0.71 MG/DL (ref 0.6–1.3)
EOSINOPHIL # BLD AUTO: 0.48 THOUSAND/ΜL (ref 0–0.61)
EOSINOPHIL NFR BLD AUTO: 7 % (ref 0–6)
ERYTHROCYTE [DISTWIDTH] IN BLOOD BY AUTOMATED COUNT: 14.3 % (ref 11.6–15.1)
GFR SERPL CREATININE-BSD FRML MDRD: 116 ML/MIN/1.73SQ M
GLUCOSE P FAST SERPL-MCNC: 100 MG/DL (ref 65–99)
GLUCOSE SERPL-MCNC: 100 MG/DL (ref 65–140)
HCT VFR BLD AUTO: 35.3 % (ref 34.8–46.1)
HGB BLD-MCNC: 11.2 G/DL (ref 11.5–15.4)
LYMPHOCYTES # BLD AUTO: 2.23 THOUSANDS/ΜL (ref 0.6–4.47)
LYMPHOCYTES NFR BLD AUTO: 34 % (ref 14–44)
MCH RBC QN AUTO: 29.9 PG (ref 26.8–34.3)
MCHC RBC AUTO-ENTMCNC: 31.7 G/DL (ref 31.4–37.4)
MCV RBC AUTO: 94 FL (ref 82–98)
MONOCYTES # BLD AUTO: 0.52 THOUSAND/ΜL (ref 0.17–1.22)
MONOCYTES NFR BLD AUTO: 8 % (ref 4–12)
NEUTROPHILS # BLD AUTO: 3.34 THOUSANDS/ΜL (ref 1.85–7.62)
NEUTS SEG NFR BLD AUTO: 50 % (ref 43–75)
PLATELET # BLD AUTO: 289 THOUSANDS/UL (ref 149–390)
PMV BLD AUTO: 11.5 FL (ref 8.9–12.7)
POTASSIUM SERPL-SCNC: 3.6 MMOL/L (ref 3.5–5.3)
PROT SERPL-MCNC: 5.4 G/DL (ref 6.4–8.2)
RBC # BLD AUTO: 3.74 MILLION/UL (ref 3.81–5.12)
SODIUM SERPL-SCNC: 140 MMOL/L (ref 136–145)
WBC # BLD AUTO: 6.6 THOUSAND/UL (ref 4.31–10.16)

## 2017-11-07 PROCEDURE — 90686 IIV4 VACC NO PRSV 0.5 ML IM: CPT | Performed by: PHYSICIAN ASSISTANT

## 2017-11-07 PROCEDURE — C9113 INJ PANTOPRAZOLE SODIUM, VIA: HCPCS | Performed by: SURGERY

## 2017-11-07 PROCEDURE — 80053 COMPREHEN METABOLIC PANEL: CPT | Performed by: PHYSICIAN ASSISTANT

## 2017-11-07 PROCEDURE — 85025 COMPLETE CBC W/AUTO DIFF WBC: CPT | Performed by: PHYSICIAN ASSISTANT

## 2017-11-07 RX ORDER — METRONIDAZOLE 500 MG/1
500 TABLET ORAL EVERY 8 HOURS SCHEDULED
Qty: 30 TABLET | Refills: 0 | Status: SHIPPED | OUTPATIENT
Start: 2017-11-07 | End: 2017-11-17

## 2017-11-07 RX ORDER — IBUPROFEN 600 MG/1
600 TABLET ORAL EVERY 6 HOURS PRN
Qty: 30 TABLET | Refills: 0 | Status: SHIPPED | OUTPATIENT
Start: 2017-11-07

## 2017-11-07 RX ORDER — LEVOFLOXACIN 500 MG/1
500 TABLET, FILM COATED ORAL EVERY 24 HOURS
Qty: 10 TABLET | Refills: 0 | Status: SHIPPED | OUTPATIENT
Start: 2017-11-07 | End: 2017-11-17

## 2017-11-07 RX ADMIN — INFLUENZA VIRUS VACCINE 0.5 ML: 15; 15; 15; 15 SUSPENSION INTRAMUSCULAR at 12:57

## 2017-11-07 RX ADMIN — PANTOPRAZOLE SODIUM 40 MG: 40 INJECTION, POWDER, FOR SOLUTION INTRAVENOUS at 09:25

## 2017-11-07 RX ADMIN — KETOROLAC TROMETHAMINE 15 MG: 30 INJECTION, SOLUTION INTRAMUSCULAR at 00:43

## 2017-11-07 RX ADMIN — DEXTROSE AND SODIUM CHLORIDE 125 ML/HR: 5; 450 INJECTION, SOLUTION INTRAVENOUS at 00:47

## 2017-11-07 RX ADMIN — KETOROLAC TROMETHAMINE 15 MG: 30 INJECTION, SOLUTION INTRAMUSCULAR at 05:31

## 2017-11-07 RX ADMIN — KETOROLAC TROMETHAMINE 15 MG: 30 INJECTION, SOLUTION INTRAMUSCULAR at 11:06

## 2017-11-07 RX ADMIN — METRONIDAZOLE 500 MG: 500 INJECTION, SOLUTION INTRAVENOUS at 01:42

## 2017-11-07 RX ADMIN — METRONIDAZOLE 500 MG: 500 INJECTION, SOLUTION INTRAVENOUS at 09:28

## 2017-11-07 RX ADMIN — LEVOFLOXACIN 750 MG: 5 INJECTION, SOLUTION INTRAVENOUS at 11:06

## 2017-11-07 NOTE — PLAN OF CARE
DISCHARGE PLANNING - CARE MANAGEMENT     Discharge to post-acute care or home with appropriate resources Adequate for Discharge        INFECTION - ADULT     Absence or prevention of progression during hospitalization Adequate for Discharge     Absence of fever/infection during neutropenic period Adequate for Discharge        PAIN - ADULT     Verbalizes/displays adequate comfort level or baseline comfort level Adequate for Discharge

## 2017-11-07 NOTE — DISCHARGE INSTRUCTIONS
TUBE CARE INSTRUCTIONS    Care after your procedure:    1  The properly functioning catheter should be forward flushed once (1x) daily with 10ml of normal saline using clean technique  You will be given a prescription for flushes  To flush the tube, clean both connections with alcohol swab  Twist off the drainage bag/ bulb  tubing and twist the saline syringe into the drainage tube and flush  Remove the syringe and twist the drainage bag / bulb tubing tubing back on     2  The drainage bag/bulb may be emptied as necessary  Keep a record of the amount of fluid you drain from your tube  This should be done with clean technique as well  3  A fresh dressing should be applied daily over the tube insertion site  4  As the tube is secured to the skin with only a suture,try not to pull on your tube  Tub baths are not permitted  Showers are permitted if the patient's skin entry site is prevented from getting wet  Similarly, washcloth "baths" are acceptable  Contact Interventional Radiology at [phone] [place] PATIENTS: Contact Interventional Radiology at [phone]) Jose Luis Valenzuela PATIENTS: Contact Interventional Radiology at [phone]) if:    1  Leakage or large amounts of liquid around the catheter  2  Fever of 101 degrees lasting several hours without other obvious cause (such as sore throat, flu, etc)  3  Diminished drainage, which may be associated with pressure or pain  4  Catheter pulled back or falls out  The following pharmacies carry the flush syringes         [facility]                     [facility]  [address] [facility] [address] [facility]                         [address] [facility]  Phone [phone]            Phone [phone]             [facility]                                [phone]  [facility] Loretta ZAVALA                      [address] [place]  Phone [phone]            Phone [phone]                      Catie Toscano                                                                                                          [phone]  [facility]  [address]  [place] [facility]  Phone [phone]    Cholecystitis   WHAT YOU NEED TO KNOW:   Cholecystitis is inflammation of your gallbladder  Your gallbladder stores bile, which helps break down the fat that you eat  Your gallbladder also helps remove certain chemicals from your body  You may have a sudden, severe attack (acute cholecystitis) or several mild attacks (chronic cholecystitis)  DISCHARGE INSTRUCTIONS:   Call 911 for any of the following:   · You have chest pain or trouble breathing  Return to the emergency department if:   · You have severe pain in your abdomen  · You urinate less than usual   Contact your healthcare provider if:   · You have a fever or chills  · You have pain when you urinate  · Your skin or eyes turn yellow  · You have questions or concerns about your condition or care  Medicines: You may need any of the following:  · Antibiotics  treat a bacterial infection  · Prescription pain medicine  may be given  Ask your healthcare provider how to take this medicine safely  Some prescription pain medicines contain acetaminophen  Do not take other medicines that contain acetaminophen without talking to your healthcare provider  Too much acetaminophen may cause liver damage  Prescription pain medicine may cause constipation  Ask your healthcare provider how to prevent or treat constipation       · NSAIDs , such as ibuprofen, help decrease swelling, pain, and fever  This medicine is available with or without a doctor's order  NSAIDs can cause stomach bleeding or kidney problems in certain people  If you take blood thinner medicine, always ask your healthcare provider if NSAIDs are safe for you  Always read the medicine label and follow directions  · Take your medicine as directed  Contact your healthcare provider if you think your medicine is not helping or if you have side effects  Tell him of her if you are allergic to any medicine  Keep a list of the medicines, vitamins, and herbs you take  Include the amounts, and when and why you take them  Bring the list or the pill bottles to follow-up visits  Carry your medicine list with you in case of an emergency  Eat a variety of healthy foods: This may decrease your symptoms  Healthy foods include fruit, vegetables, whole-grain breads, low-fat dairy products, beans, lean meat, and fish  Ask if you need to be on a special diet  Follow up with your healthcare provider as directed:  Write down your questions so you remember to ask them during your visits  © 2017 [facility] Information is for End User's use only and may not be sold, redistributed or otherwise used for commercial purposes  All illustrations and images included in CareNotes® are the copyrighted property of [employer]  or Hitesh Sonali  The above information is an  only  It is not intended as medical advice for individual conditions or treatments  Talk to your doctor, nurse or pharmacist before following any medical regimen to see if it is safe and effective for you  Nan Gear Instructions                                                 Dr Baird Councilman M D     1  General: Toño Grimm will feel pulling sensations around the wound or funny aches and pains around the incisions   This is normal  Even minor surgery is a change in your body and this is your bodys way of reaction to it  If you have had abdominal surgery, it may help to support the incision with a small pillow or blanket for comfort when moving or coughing  2  Wound care:    Drained dressing at catheter site daily  Empty and record drain output 2 times daily and as needed  Bring a record of output to follow-up appointment with Dr Sara Betancourt  3  Water: You may shower over the wound, unless there are drain tubes left in place  Do not bathe or use a pool or hot tub until cleared by the physician  You may shower right over the staples, glue or Steri-Strips and rinse wound with soapy water but do not scrub incision pat dry when you are done  4  Activity: You may go up and down stairs, walk as much as you are comfortable, but walk at least 3 times each day  No lifting restrictions  5  Diet: You may resume a regular diet  If you had a same-day surgery or overnight stay surgery, you may wish to eat lightly for a few days: soups, crackers, and sandwiches  You may resume a regular diet when ready  6  Medications: Resume all of your previous medications, unless told otherwise by the doctor  Avoid aspirin or ibuprofen (Advil, Motrin, etc ) products for 2-3 days after the date of surgery  You may, at that time, began to take them again  Tylenol is always fine, unless you are taking any narcotic pain medication containing Tylenol (such as Percocet, Darvocet, Vicodin, or anything containing acetaminophen)  Do not take Tylenol if you're taking these medications  You do not need to take the narcotic pain medications unless you are having significant pain and discomfort  7  Driving: He will need someone to drive you home on the day of surgery  Do not drive or make any important decisions while on narcotic pain medication or 24 hours and after anesthesia or sedation for surgery  Generally, you may drive when your off all narcotic pain medications      8  Upset Stomach: You may take Maalox, Tums, or similar items for an upset stomach  If your narcotic pain medication causes an upset stomach, do not take it on an empty stomach  Try taking it with at least some crackers or toast      9  Constipation: Patients often experienced constipation after surgery  You may take over-the-counter medication for this, such as Metamucil, Senokot, Dulcolax, milk of magnesia, etc  You may take a suppository unless you have had anorectal surgery such as a procedure on your hemorrhoids  If you experience significant nausea or vomiting after abdominal surgery, call the office before trying any of these medications  10  Call the office: If you are experiencing any of the following, fevers above 101 5°, significant nausea or vomiting, if the wound develops drainage and/or is excessive redness around the wound, or if you have significant diarrhea or other worsening symptoms  11  Pain: You may be given a prescription for pain  This will be given to the hospital, the day of surgery  12  Sexual Activity: You may resume sexual activity when you feel ready and comfortable and your incision is sealed and healed without apparent infection risk  13   Call to schedule an appointment with Dr Taylor Bullard in 2 weeks      [facility]  Phone: [phone]

## 2017-11-07 NOTE — PROGRESS NOTES
Progress Note - General Surgery   Buck Almonte 28 y o  female MRN: [id number]  Unit/Bed#: [address] [place] [address] 210-02 Encounter: [id number]    Assessment:  Acute cholecystitis  Postop day 1  Status post cholecystostomy tube placement by IR  Two week history of abdominal pain- improved  Leukocytosis-resolved  History of heroin abuse on Vivitrol  Hepatitis-C- treated in 2007  Hypothyroid    Plan:  Plan for discharge to home today  Advance to regular diet as tolerated  Will send on oral antibiotics  Ibuprofen for pain control  DC IV fluids  Restart home medications  Continue Vivitrol as scheduled  Smoking cessation discussed- patient not interested in quitting  Patient will have tube check in approximately 4 weeks to be done by IR  Drain teaching- empty and record drain output 2 times daily and as needed, bring a record to follow-up appointment  Follow up with Dr Vona Ormond in his office in 2 weeks  Plan for cholecystectomy in approximately 8-12 weeks      Subjective/Objective   Chief Complaint:  Abdominal pain    Subjective:  Patient feeling much better today  Complains only mild right upper quadrant pain  Cholecystostomy tube in place and draining well  Pain controlled with Toradol  Tolerating clear liquids  Out of bed and ambulating  Objective:     Blood pressure 102/60, pulse 56, temperature 98 9 °F (37 2 °C), temperature source Oral, resp  rate 18, height 5' 5" (1 651 m), weight 77 7 kg (171 lb 4 8 oz), last menstrual period 10/25/2017, SpO2 98 %  ,Body mass index is 28 51 kg/m²        Intake/Output Summary (Last 24 hours) at 11/07/17 0732  Last data filed at 11/07/17 0600   Gross per 24 hour   Intake             1220 ml   Output              870 ml   Net              350 ml       Invasive Devices     Peripheral Intravenous Line            Peripheral IV 11/06/17 Right Antecubital 1 day          Drain            Closed/Suction Drain Right RLQ Bulb 8 5 Fr  less than 1 day                Physical Exam:   General appearance: alert, appears stated age, cooperative and no distress  Lungs: clear to auscultation bilaterally  Heart: regular rate and rhythm and S1, S2 normal  Abdomen:  Soft, mild tender over right upper quadrant, no rebound or guarding, nondistended, active bowel sounds  Right upper quadrant drain in place with bile tinged drainage  Extremities: no edema, redness or tenderness in the calves or thighs  Skin:  No rashes or lesions    Lab, Imaging and other studies:  I have personally reviewed pertinent lab results    , CBC:   Lab Results   Component Value Date    WBC 6 60 11/07/2017    HGB 11 2 (L) 11/07/2017    HCT 35 3 11/07/2017    MCV 94 11/07/2017     11/07/2017    MCH 29 9 11/07/2017    MCHC 31 7 11/07/2017    RDW 14 3 11/07/2017    MPV 11 5 11/07/2017   , CMP:   Lab Results   Component Value Date     11/07/2017    K 3 6 11/07/2017     (H) 11/07/2017    CO2 21 11/07/2017    ANIONGAP 9 11/07/2017    BUN 4 (L) 11/07/2017    CREATININE 0 71 11/07/2017    GLUCOSE 100 11/07/2017    CALCIUM 7 5 (L) 11/07/2017    AST 14 11/07/2017    ALT 17 11/07/2017    ALKPHOS 25 (L) 11/07/2017    PROT 5 4 (L) 11/07/2017    ALBUMIN 2 9 (L) 11/07/2017    BILITOT 0 40 11/07/2017    EGFR 116 11/07/2017     VTE Pharmacologic Prophylaxis: Heparin  VTE Mechanical Prophylaxis: sequential compression device     Keo Greene PA-C

## 2017-11-07 NOTE — DISCHARGE SUMMARY
Discharge Summary - Cathy Mcginnis 28 y o  female MRN: [id number]    Unit/Bed#: [address] [place] [address] Encounter: [id number]    Admission Date: 11/6/2017   Discharge Date: 11/07/17    Admitting Diagnosis:   Acute cholecystitis [K81 0]  Leukocytosis [D72 829]  Abdominal pain [X71 7]  Colicky RUQ abdominal pain [R10 11]  Transient hypotension [I95 9]    Discharge Diagnoses: Principal Problem:    Acute cholecystitis  s/p cholecystostomy tube placement  Abdominal pain resolved  Leukocytosis resolved  Hypotension resolved    Consultations:  Interventional Radiology    Procedures Performed:  Cholecystostomy tube placement by Interventional Radiology    History of Present Patito Vuong is a 28 y o  female who presented to the emergency department complaining of  a 2 week history of right upper quadrant pain with associated episodes of nausea and vomiting  CT scan finding with acute cholecystitis and extensive gallbladder inflammatory reaction  Hospital Course: Cathy Mcginnis is a 28 y o  female admitted for monitoring and pain management  She was evaluated by the surgical service and felt to be too high risk to proceed to the operating room due to her extensive length of right upper quadrant symptoms and severe inflammatory findings on CT scan  She was started on IV fluids and IV antibiotics  A consult was placed Interventional Radiology and the patient was taken for a cholecystostomy tube placement which was performed on 96/53/1902 without complications  There was thick dark sludge drainage from the cholecystostomy tube  Patient's symptoms improved after the tube placement  Her abdominal pain improved  Her leukocytosis improved  She remained stable and afebrile  She was started on a diet which was able to be advanced without issues  She was able to get out of bed and ambulate  The patient was able to be discharged to home on hospital day 2  She will remain on oral antibiotics    She will follow up with Dr Cecile Madden in his office in 2 weeks  She was taught how to care for her cholecystostomy drain  She will monitor and record the output  She will bring this record to our follow-up office appointment  She will need a tube check to be done by interventional Radiology in approximately 4 weeks  She will eventually be scheduled for a cholecystectomy in approximately 8-12 weeks  Because the patient is on Vivitrol her pain was managed without narcotics  Her pain was well managed  She will return home on her ventral regimen and use ibuprofen for pain control  Condition at Discharge: stable     Discharge instructions/Information to patient and family:   See after visit summary for information provided to patient and family  Provisions for Follow-Up Care:  See after visit summary for information related to follow-up care and any pertinent home health orders  Disposition: Home    Planned Readmission: No    Discharge Statement   I spent 30 minutes discharging the patient  This time was spent on the day of discharge  I had direct contact with the patient on the day of discharge  Additional documentation is required if more than 30 minutes were spent on discharge  Discharge Medications:  See after visit summary for reconciled discharge medications provided to patient and family        Mookie Bazan PA-C

## 2017-11-07 NOTE — PROGRESS NOTES
Pt observed to be sleeping during most hrly rounds overnight  OOB x2 to void; SAM drain pinned to gown and emptied x2  States Toradol moderately effective for c/o abd pain

## 2017-11-07 NOTE — SOCIAL WORK
Met with patient  Explained role of care management  Patient lives in a two story home with her parents and 6year old daughter  She is independent adl's, works  License is suspended - parents transport  DME - denies  Past services - denies  [facility]  D&A [facility]  She states that she has insurance through [employer]  No services anticipated  Will follow

## 2017-11-09 LAB
BACTERIA SPEC BFLD CULT: NO GROWTH
GRAM STN SPEC: NORMAL

## 2017-11-17 ENCOUNTER — APPOINTMENT (EMERGENCY)
Dept: CT IMAGING | Facility: HOSPITAL | Age: 27
End: 2017-11-17

## 2017-11-17 ENCOUNTER — HOSPITAL ENCOUNTER (EMERGENCY)
Facility: HOSPITAL | Age: 27
Discharge: HOME | End: 2017-11-17
Attending: EMERGENCY MEDICINE | Admitting: EMERGENCY MEDICINE

## 2017-11-17 VITALS
OXYGEN SATURATION: 100 % | TEMPERATURE: 97.7 F | BODY MASS INDEX: 26.66 KG/M2 | SYSTOLIC BLOOD PRESSURE: 112 MMHG | WEIGHT: 160 LBS | DIASTOLIC BLOOD PRESSURE: 70 MMHG | HEART RATE: 79 BPM | RESPIRATION RATE: 16 BRPM | HEIGHT: 65 IN

## 2017-11-17 DIAGNOSIS — Z43.4 CHOLECYSTOSTOMY CARE (HCC): Primary | ICD-10-CM

## 2017-11-17 LAB
ALBUMIN SERPL BCP-MCNC: 3.9 G/DL (ref 3.5–5)
ALP SERPL-CCNC: 32 U/L (ref 46–116)
ALT SERPL W P-5'-P-CCNC: 22 U/L (ref 12–78)
ANION GAP BLD CALC-SCNC: 17 MMOL/L (ref 4–13)
AST SERPL W P-5'-P-CCNC: 17 U/L (ref 5–45)
BASOPHILS # BLD AUTO: 0.07 THOUSANDS/ΜL (ref 0–0.1)
BASOPHILS NFR BLD AUTO: 1 % (ref 0–1)
BILIRUB DIRECT SERPL-MCNC: 0.07 MG/DL (ref 0–0.2)
BILIRUB SERPL-MCNC: 0.4 MG/DL (ref 0.2–1)
BUN BLD-MCNC: 18 MG/DL (ref 5–25)
CA-I BLD-SCNC: 1.16 MMOL/L (ref 1.12–1.32)
CHLORIDE BLD-SCNC: 104 MMOL/L (ref 100–108)
CLARITY, POC: CLEAR
COLOR, POC: NORMAL
CREAT BLD-MCNC: 0.8 MG/DL (ref 0.6–1.3)
EOSINOPHIL # BLD AUTO: 0.53 THOUSAND/ΜL (ref 0–0.61)
EOSINOPHIL NFR BLD AUTO: 4 % (ref 0–6)
ERYTHROCYTE [DISTWIDTH] IN BLOOD BY AUTOMATED COUNT: 13.7 % (ref 11.6–15.1)
EXT BILIRUBIN, UA: NEGATIVE
EXT BLOOD URINE: NEGATIVE
EXT GLUCOSE, UA: NEGATIVE
EXT KETONES: NEGATIVE
EXT NITRITE, UA: NEGATIVE
EXT PH, UA: 8.6
EXT PREG TEST URINE: NEGATIVE
EXT PROTEIN, UA: NORMAL
EXT SPECIFIC GRAVITY, UA: 1.01
EXT UROBILINOGEN: 0.2
GFR SERPL CREATININE-BSD FRML MDRD: 101 ML/MIN/1.73SQ M
GLUCOSE SERPL-MCNC: 78 MG/DL (ref 65–140)
HCT VFR BLD AUTO: 40.2 % (ref 34.8–46.1)
HCT VFR BLD CALC: 42 % (ref 34.8–46.1)
HGB BLD-MCNC: 13.8 G/DL (ref 11.5–15.4)
HGB BLDA-MCNC: 14.3 G/DL (ref 11.5–15.4)
LYMPHOCYTES # BLD AUTO: 3.67 THOUSANDS/ΜL (ref 0.6–4.47)
LYMPHOCYTES NFR BLD AUTO: 30 % (ref 14–44)
MCH RBC QN AUTO: 31 PG (ref 26.8–34.3)
MCHC RBC AUTO-ENTMCNC: 34.3 G/DL (ref 31.4–37.4)
MCV RBC AUTO: 90 FL (ref 82–98)
MONOCYTES # BLD AUTO: 0.95 THOUSAND/ΜL (ref 0.17–1.22)
MONOCYTES NFR BLD AUTO: 8 % (ref 4–12)
NEUTROPHILS # BLD AUTO: 7.1 THOUSANDS/ΜL (ref 1.85–7.62)
NEUTS SEG NFR BLD AUTO: 57 % (ref 43–75)
PCO2 BLD: 23 MMOL/L (ref 21–32)
PLATELET # BLD AUTO: 382 THOUSANDS/UL (ref 149–390)
PMV BLD AUTO: 10.7 FL (ref 8.9–12.7)
POTASSIUM BLD-SCNC: 3.9 MMOL/L (ref 3.5–5.3)
PROT SERPL-MCNC: 7.2 G/DL (ref 6.4–8.2)
RBC # BLD AUTO: 4.45 MILLION/UL (ref 3.81–5.12)
SODIUM BLD-SCNC: 139 MMOL/L (ref 136–145)
SPECIMEN SOURCE: ABNORMAL
WBC # BLD AUTO: 12.32 THOUSAND/UL (ref 4.31–10.16)
WBC # BLD EST: NEGATIVE 10*3/UL

## 2017-11-17 PROCEDURE — 85025 COMPLETE CBC W/AUTO DIFF WBC: CPT | Performed by: EMERGENCY MEDICINE

## 2017-11-17 PROCEDURE — 80047 BASIC METABLC PNL IONIZED CA: CPT

## 2017-11-17 PROCEDURE — 99284 EMERGENCY DEPT VISIT MOD MDM: CPT

## 2017-11-17 PROCEDURE — 36415 COLL VENOUS BLD VENIPUNCTURE: CPT | Performed by: EMERGENCY MEDICINE

## 2017-11-17 PROCEDURE — 80076 HEPATIC FUNCTION PANEL: CPT | Performed by: EMERGENCY MEDICINE

## 2017-11-17 PROCEDURE — 85014 HEMATOCRIT: CPT

## 2017-11-17 PROCEDURE — 81002 URINALYSIS NONAUTO W/O SCOPE: CPT | Performed by: EMERGENCY MEDICINE

## 2017-11-17 PROCEDURE — 81025 URINE PREGNANCY TEST: CPT | Performed by: EMERGENCY MEDICINE

## 2017-11-17 PROCEDURE — 96374 THER/PROPH/DIAG INJ IV PUSH: CPT

## 2017-11-17 PROCEDURE — 74177 CT ABD & PELVIS W/CONTRAST: CPT

## 2017-11-17 RX ORDER — KETOROLAC TROMETHAMINE 30 MG/ML
15 INJECTION, SOLUTION INTRAMUSCULAR; INTRAVENOUS ONCE
Status: COMPLETED | OUTPATIENT
Start: 2017-11-17 | End: 2017-11-17

## 2017-11-17 RX ADMIN — IOHEXOL 85 ML: 350 INJECTION, SOLUTION INTRAVENOUS at 19:30

## 2017-11-17 RX ADMIN — KETOROLAC TROMETHAMINE 15 MG: 30 INJECTION, SOLUTION INTRAMUSCULAR at 19:28

## 2017-11-17 NOTE — ED PROVIDER NOTES
History  Chief Complaint   Patient presents with    Abdominal Pain     Pt started with RUQ pain this morning Hx of infected gallbladder with SAM drain in place  Pt reports approx 1 hour ago she notifed her SAM drain (in pl;ace for was filled with air  pt went to drain air and compress bulb when the bulb slipped out of her hand and pulled tube partially out of site  pt states she pushed tube back in but continues with RUQ pain  Admitted 11//6 for RUQ pain - found to have cholecystitis with sig surrounding inflammation  Had cholecystostomy tube placed by IR and improved w/ fluids and abx  D/c home 11/7 w/ instructions for home care  Had been doing well and 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  Has limited lifting to 10-15lbs  Woke this am w/ increased pain in the RUQ today and persistent pain all day  Tonight was going to drain bulb and accidentally dropped it  States it pulled the tube out "at least an inch" despite the sutures  Pt 'pushed the tube back in' and has had more pain since then  This am noted an increase in the bloody tinge to the fluids, and now notes decreased fluid drainage  Denies f/c, +nausea, no vomiting  No cough/congestion, no sob/almazan, no urinary symptoms, no changes in bms          History provided by:  Patient and parent   used: No    Abdominal Pain   Pain location:  RUQ  Pain quality: aching and sharp    Pain radiates to:  Does not radiate  Pain severity:  Moderate  Onset quality:  Gradual  Duration:  1 day  Timing:  Constant  Progression:  Worsening  Chronicity:  Recurrent  Context: not diet changes, not recent illness, not sick contacts and not suspicious food intake    Relieved by:  Nothing  Worsened by:  Nothing  Ineffective treatments:  None tried  Associated symptoms: no anorexia, no chest pain, no chills, no constipation, no diarrhea, no dysuria, no fatigue, no fever, no nausea and no vomiting    Risk factors: recent hospitalization    Risk factors: has not had multiple surgeries        Prior to Admission Medications   Prescriptions Last Dose Informant Patient Reported? Taking? Naltrexone (VIVITROL IM)   Yes No   Sig: Inject 300 mg into the shoulder, thigh, or buttocks   ibuprofen (MOTRIN) 600 mg tablet   No No   Sig: Take 1 tablet by mouth every 6 (six) hours as needed for mild pain or moderate pain   levofloxacin (LEVAQUIN) 500 mg tablet   No No   Sig: Take 1 tablet by mouth every 24 hours for 10 days   metroNIDAZOLE (FLAGYL) 500 mg tablet   No No   Sig: Take 1 tablet by mouth every 8 (eight) hours for 10 days      Facility-Administered Medications: None       Past Medical History:   Diagnosis Date    Disease of thyroid gland     hypoactive    Hepatitis C     Sciatica        Past Surgical History:   Procedure Laterality Date    KNEE ARTHROSCOPY W/ ACL RECONSTRUCTION Right        History reviewed  No pertinent family history  I have reviewed and agree with the history as documented  Social History   Substance Use Topics    Smoking status: Current Every Day Smoker    Smokeless tobacco: Never Used    Alcohol use No        Review of Systems   Constitutional: Negative for chills, fatigue and fever  Cardiovascular: Negative for chest pain  Gastrointestinal: Positive for abdominal pain  Negative for anorexia, constipation, diarrhea, nausea and vomiting  Genitourinary: Negative for dysuria  All other systems reviewed and are negative        Physical Exam  ED Triage Vitals   Temperature Pulse Respirations Blood Pressure SpO2   11/17/17 1820 11/17/17 1820 11/17/17 1820 11/17/17 1820 11/17/17 2000   97 7 °F (36 5 °C) 97 16 120/79 97 %      Temp src Heart Rate Source Patient Position - Orthostatic VS BP Location FiO2 (%)   -- 11/17/17 1820 11/17/17 1820 11/17/17 1820 --    Monitor Sitting Right arm       Pain Score       11/17/17 1820       7           Orthostatic Vital Signs  Vitals:    11/17/17 1820 11/17/17 2000 11/17/17 2145   BP: 120/79 108/63 112/70   Pulse: 97 72 79   Patient Position - Orthostatic VS: Sitting Lying        Physical Exam   Constitutional: She appears well-developed and well-nourished  HENT:   Nose: Nose normal    Mouth/Throat: Oropharynx is clear and moist    Eyes: Conjunctivae are normal    Neck: Neck supple  Cardiovascular: Normal rate and regular rhythm  Pulmonary/Chest: Effort normal and breath sounds normal    Abdominal: Soft  Bowel sounds are decreased  There is tenderness in the right upper quadrant and epigastric area  There is no rigidity, no rebound and no guarding  Musculoskeletal: She exhibits no deformity  Neurological: She is alert  Skin: Skin is warm  Psychiatric: She has a normal mood and affect  Nursing note and vitals reviewed        ED Medications  Medications   ketorolac (TORADOL) 30 mg/mL injection 15 mg (15 mg Intravenous Given 11/17/17 1928)   iohexol (OMNIPAQUE) 350 MG/ML injection (MULTI-DOSE) 85 mL (85 mL Intravenous Given 11/17/17 1930)       Diagnostic Studies  Results Reviewed     Procedure Component Value Units Date/Time    POCT pregnancy, urine [[id number]]  (Normal) Resulted:  11/17/17 1933    Lab Status:  Final result Updated:  11/17/17 1934     EXT PREG TEST UR (Ref: Negative) negative    POCT urinalysis dipstick [[id number]]  (Normal) Resulted:  11/17/17 1932    Lab Status:  Final result Specimen:  Urine Updated:  11/17/17 1933     Color, UA obed     Clarity, UA clear     EXT Glucose, UA negative     EXT Bilirubin, UA (Ref: Negative) negative     EXT Ketones, UA (Ref: Negative) negative     EXT Spec Grav, UA 1 010     EXT Blood, UA (Ref: Negative) negative     EXT pH, UA 8 6     EXT Protein, UA (Ref: Negative) 100++     EXT Urobilinogen, UA (Ref: 0 2- 1 0) 0 2     EXT Leukocytes, UA (Ref: Negative) negative     EXT Nitrite, UA (Ref: Negative) negative    Hepatic function panel [[id number]]  (Abnormal) Collected:  11/17/17 1851    Lab Status:  Final result Specimen:  Blood from Arm, Right Updated:  11/17/17 1923     Total Bilirubin 0 40 mg/dL      Bilirubin, Direct 0 07 mg/dL      Alkaline Phosphatase 32 (L) U/L      AST 17 U/L      ALT 22 U/L      Total Protein 7 2 g/dL      Albumin 3 9 g/dL     CBC and differential [[id number]]  (Abnormal) Collected:  11/17/17 1851    Lab Status:  Final result Specimen:  Blood from Arm, Right Updated:  11/17/17 1908     WBC 12 32 (H) Thousand/uL      RBC 4 45 Million/uL      Hemoglobin 13 8 g/dL      Hematocrit 40 2 %      MCV 90 fL      MCH 31 0 pg      MCHC 34 3 g/dL      RDW 13 7 %      MPV 10 7 fL      Platelets 733 Thousands/uL      Neutrophils Relative 57 %      Lymphocytes Relative 30 %      Monocytes Relative 8 %      Eosinophils Relative 4 %      Basophils Relative 1 %      Neutrophils Absolute 7 10 Thousands/µL      Lymphocytes Absolute 3 67 Thousands/µL      Monocytes Absolute 0 95 Thousand/µL      Eosinophils Absolute 0 53 Thousand/µL      Basophils Absolute 0 07 Thousands/µL     POCT Chem 8+ [[id number]]  (Abnormal) Collected:  11/17/17 1859    Lab Status:  Final result Updated:  11/17/17 1903     SODIUM, I-STAT 139 mmol/l      Potassium, i-STAT 3 9 mmol/L      Chloride, istat 104 mmol/L      CO2, i-STAT 23 mmol/L      Anion Gap, Istat 17 (H) mmol/L      Calcium, Ionized i-STAT 1 16 mmol/L      BUN, I-STAT 18 mg/dl      Creatinine, i-STAT 0 8 mg/dl      eGFR 101 ml/min/1 73sq m      Glucose, i-STAT 78 mg/dl      Hct, i-STAT 42 %      Hgb, i-STAT 14 3 g/dl      Specimen Type VENOUS                 CT abdomen pelvis with contrast   Final Result by Federica Ferro MD (11/17 2027)      1  Improved inflammatory changes associated with the gallbladder with interval decompression status post drainage catheter placement  Multiple stones again seen  2   Fecal stasis           Workstation performed: [id number]                    Procedures  Procedures       Phone Contacts  ED Phone Contact    ED Course  ED Course as of Nov 21 1121 Fri Nov 17, 2017   1900 Care transferred to Dr Chris Ladd Time    Disposition  Final diagnoses:   Cholecystostomy care [facility])     Time reflects when diagnosis was documented in both MDM as applicable and the Disposition within this note     Time User Action Codes Description Comment    11/17/2017  9:46 PM Michaeline Sessions Add [Z43 4] Cholecystostomy [facility])     11/17/2017  9:46 PM Michaeline Sessions Remove [Z43 4] Cholecystostomy [facility])     11/17/2017  9:47 PM Michaeline Sessions Add [Z43 4] Cholecystostomy [facility])       ED Disposition     ED Disposition Condition Comment    Discharge  Meaghan Cartwright discharge to home/self care  Condition at discharge: Stable        Follow-up Information     Follow up With Specialties Details Why Contact Info    Bert Hu MD General Surgery Call in 1 day As needed [address]          Discharge Medication List as of 11/17/2017  9:49 PM      CONTINUE these medications which have NOT CHANGED    Details   ibuprofen (MOTRIN) 600 mg tablet Take 1 tablet by mouth every 6 (six) hours as needed for mild pain or moderate pain, Starting Tue 11/7/2017, Print      levofloxacin (LEVAQUIN) 500 mg tablet Take 1 tablet by mouth every 24 hours for 10 days, Starting Tue 11/7/2017, Until Fri 11/17/2017, Print      metroNIDAZOLE (FLAGYL) 500 mg tablet Take 1 tablet by mouth every 8 (eight) hours for 10 days, Starting Tue 11/7/2017, Until Fri 11/17/2017, Print      Naltrexone (VIVITROL IM) Inject 300 mg into the shoulder, thigh, or buttocks, Historical Med           No discharge procedures on file      ED Provider  Electronically Signed by           Tomas Ferrer DO  11/21/17 1122

## 2017-11-18 NOTE — ED CARE HANDOFF
Emergency Department Sign Out Note        Sign out and transfer of care from EP  See Separate Emergency Department note  The patient, Lina Daley, was evaluated by the previous provider for  Right upper quadrant pain status post cholecystostomy tube  Workup Completed:   examined, labs and imaging ordered  ED Course / Workup Pending (followup): Await CT result                          ED Course      Procedures  MDM  Number of Diagnoses or Management Options  Cholecystostomy care [facility]): minor     Amount and/or Complexity of Data Reviewed  Tests in the radiology section of CPT®: reviewed  Decide to obtain previous medical records or to obtain history from someone other than the patient: yes  Discuss the patient with other providers: yes  Independent visualization of images, tracings, or specimens: yes      CritCare Time      Disposition  Final diagnoses:   Cholecystostomy care [facility])     Time reflects when diagnosis was documented in both MDM as applicable and the Disposition within this note     Time User Action Codes Description Comment    11/17/2017  9:46 PM Valdez Christine Add [Z43 4] Cholecystostomy care ([facility] [address] )     11/17/2017  9:46 PM Valdez Christine Remove [Z43 4] Cholecystostomy care [facility])     11/17/2017  9:47 PM Jerome Christine Add [Z43 4] Cholecystostomy care [facility])       ED Disposition     ED Disposition Condition Comment    Discharge  Lina Daley discharge to home/self care      Condition at discharge: Stable        Follow-up Information     Follow up With Specialties Details Why Contact Info    Britany Montana MD General Surgery Call in 1 day As needed [address]          Discharge Medication List as of 11/17/2017  9:49 PM      CONTINUE these medications which have NOT CHANGED    Details   ibuprofen (MOTRIN) 600 mg tablet Take 1 tablet by mouth every 6 (six) hours as needed for mild pain or moderate pain, Starting Tue 11/7/2017, Print      levofloxacin (LEVAQUIN) 500 mg tablet Take 1 tablet by mouth every 24 hours for 10 days, Starting Tue 11/7/2017, Until Fri 11/17/2017, Print      metroNIDAZOLE (FLAGYL) 500 mg tablet Take 1 tablet by mouth every 8 (eight) hours for 10 days, Starting Tue 11/7/2017, Until Fri 11/17/2017, Print      Naltrexone (VIVITROL IM) Inject 300 mg into the shoulder, thigh, or buttocks, Historical Med           No discharge procedures on file         ED Provider  Electronically Signed by

## 2017-11-18 NOTE — DISCHARGE INSTRUCTIONS
How to Care for Your Chest or Abdominal Catheter   WHAT YOU NEED TO KNOW:   A chest or abdominal catheter helps remove fluid from your chest or abdomen  This may decrease symptoms such as shortness of breath, pain, or nausea  One end of the catheter sits inside your abdomen or chest  The other end sits outside of your body  Your healthcare provider will show you or your caregiver how to drain fluid through the catheter  DISCHARGE INSTRUCTIONS:   Call 911 or have someone else call for any of the following:   · You have trouble breathing  · You faint or lose consciousness when you drain your catheter  Return to the emergency department if:   · Your catheter comes out  · You feel weak, dizzy, or faint  · You have severe pain or shortness of breath when you drain fluid or after you stop draining  Contact your healthcare provider if:   · Your symptoms, such as pain or shortness of breath, do not get better after you drain fluid  · You have redness, pain, or pus where the catheter is located  · You have a fever  · You cannot drain fluid  · You see a change in the color of fluid that you drain  · You see fluid leaking from around your catheter  · You drain foul-smelling fluid or bloody fluid from your catheter  · You see a hole or tear in your catheter and need to use the emergency clip  · You have questions or concerns about your condition or care  How often you should drain fluid:  Your healthcare provider will tell you how often to drain fluid  He may tell you to follow a schedule, such as draining once a day or once every other day  He may instead tell you to drain fluid when you have symptoms such as pain  Before you drain fluid from your catheter:   · Wash your hands  Remove all rings  Use soap and water or an alcohol-based hand rub  This will help prevent an infection  · Gather your supplies  Get a drainage kit and place it on a firm, clean surface  Drainage kits contain either a 500 mL or 1000 mL bottle and a procedure kit  Your healthcare provider will tell you which bottle to use  · Gently remove the old bandage  Do not pull on the drain when you remove the bandage  Throw the bandage away  · Wash your hands a second time  Use soap and water or an alcohol-based hand rub  · Open the drainage kit and remove the procedure kit  Remove the bandage and place it on your clean surface  Leave the bottle with drainage tubing in the package  Remove the procedure kit and place it on your clean surface with the folded side facing up  Carefully unfold the corners of the procedure kit  Do not touch anything inside of the procedure kit  Everything inside of the procedure kit is sterile  · Prepare the drainage tubing and bottle  Uncoil the drainage tubing that is connected to the bottle  Do not touch the end of the drainage tubing  Do not remove the cover from the end of the drainage tubing  Lay the drainage tubing on the procedure kit  · Put on gloves  Both gloves fit either hand   each glove up by the folded part and put them on  Do not touch any part of the outside of the gloves with your bare hand  Do not let them touch anything that is not sterile  · Open the smaller packages inside of the procedure kit  Open the package that contains the new catheter cap  Let the cap gently fall onto the procedure kit  Tear open the alcohol pads, but do not remove them from their pouches  · Squeeze or roll the clamp closed on the drainage tubing  If the clamp rolls, roll it towards the bottle  · Remove the cap on the end of your catheter  Hold the catheter close to the cap  Twist the cap counter clockwise and pull it off gently  Throw the cap away  Do not let the end of the catheter touch anything  · Clean the end of the catheter  Use 1 alcohol pad from the procedure kit  Wipe around the end of the catheter in circles   Do not put anything into the end of the catheter to clean it  This could damage the catheter  How to drain fluid from your catheter:   · Connect the end of your catheter to the drainage tubing  Remove the cover from the end of the drainage tubing  Hold the end of your catheter in one hand and the drainage tubing in your other hand  Insert the drainage tubing into your catheter until you hear or feel a click  · Remove the lock clip on the bottle  Twist and pull gently to remove it  · Push down on the bottle's plunger  Use 1 hand to hold the bottle steady  Push down gently on the T-shaped plunger at the top of the bottle  This will create a vacuum inside the bottle and help drain fluid  Keep the bottle on a firm surface  · Open the clamp on the drainage tubing  This will start the flow of fluid  · Drain as much fluid as directed  Do not drain more than 1000 mL of fluid from a chest catheter  Do not remove more than 2000 mL of fluid from an abdominal catheter  To make the fluid drain slower, roll the clamp toward the bottle or gently squeeze the clamp  To make the fluid drain faster, slide the clamp away from the bottle or slowly release the clamp  It is normal to feel pain or cough when fluid is drained  To decrease pain or coughing, slow down the flow of fluid  You can also close the clamp and take a break  If your symptoms do not get better when you stop draining, do not continue to drain fluid  · Change the bottle when it is full  If you need to use a second bottle, close the clamp on the drainage tubing  This will stop fluid from draining  Hold the end of your catheter  Pull out the end of the drainage tubing from your catheter  Do not let the end of your catheter touch anything  Remove the cap on the end of the new drainage tubing  Insert the drainage tubing into your catheter  Remove the support clip on the bottle  Push down on the bottle plunger  Open the clamp on the drainage tubing   Continue to remove as much fluid as directed  · Close the clamp on the drainage tubing to stop fluid from draining  Check that the clamp is completely closed  · Pull out the end of the drainage tubing from your catheter  Do not let the end of your catheter touch anything  · Clean the end of your catheter with a new alcohol pad  This will help prevent infection  · Place the new cap over the end of your catheter  Twist it clockwise until you hear or feel a click  ·        After you drain fluid from your catheter:   · Clean the skin around your catheter with a new alcohol pad  Start closest to where the catheter is inserted in your skin  Move the alcohol pad in circles away from your catheter  · Place the foam pad around your catheter  The foam pad should have a small cut in it  This cut lets you fit the foam pad around your catheter  · Loop the end of the catheter  Place the looped catheter on top of the foam pad  Hold it on top of the foam pad  Place the gauze from your procedure kit over the catheter  Make sure the catheter is completely covered by the gauze  · Remove your gloves  This will make it easier to handle the clear sticky bandage  · Place the sticky bandage over the gauze  There are 3 layers you need to remove from the bandage  Remove the larger white layer from the bandage  Place the bandage over your gauze so the gauze is in the center of the bandage  Hold one corner of the bandage and remove the larger clear layer of the bandage  Remove the last white layer of the bandage  Press gently on all corners of the bandage to help it stick to your skin  · Write down the amount of fluid you drained  There are measurements on the side of the bottle  Also write down the color of the fluid, the date, and the time  Bring this record with you to your follow-up visits  · Empty the fluid from the bottle  Hold the bottle steady with one hand   Push down on the plunger at the top of the bottle  Move the plunger in circles  Open the clamp on the drainage tubing for 10 seconds and then close it  Pull back the plunger  Push sideways and down on the side of the bottle cap to loosen it  Remove the drainage tubing from the bottle  Empty the bottle of fluid into the toilet  · Place the drainage tubing and bottle in a sealable plastic bag  Throw the bag away with your regular garbage  Never recycle the plastic bottle  Other important information:   · If your catheter comes out, cover the opening in your skin with sterile gauze and a bandage  Use the sterile gauze and bandage from your drainage kit  · Do not take a bath or swim in hot tubs or pools  This can cause an infection  · You can shower or take a sponge bath  Make sure your bandage covers your catheter before you bathe  The edges of the bandage should make contact with your skin on all sides  This will prevent water from getting into your drain  If your bandage gets wet, remove the bandage  Clean your skin around the catheter and replace the bandage as directed  Follow up with your healthcare provider as directed:  Write down your questions so you remember to ask them during your visits  © 2017 [facility] Information is for End User's use only and may not be sold, redistributed or otherwise used for commercial purposes  All illustrations and images included in CareNotes® are the copyrighted property of [employer]  or [employer]  The above information is an  only  It is not intended as medical advice for individual conditions or treatments  Talk to your doctor, nurse or pharmacist before following any medical regimen to see if it is safe and effective for you

## 2017-11-27 ENCOUNTER — HOSPITAL ENCOUNTER (EMERGENCY)
Facility: HOSPITAL | Age: 27
Discharge: HOME | End: 2017-11-27
Attending: EMERGENCY MEDICINE

## 2017-11-27 VITALS
SYSTOLIC BLOOD PRESSURE: 115 MMHG | HEART RATE: 84 BPM | HEIGHT: 65 IN | BODY MASS INDEX: 26.66 KG/M2 | RESPIRATION RATE: 20 BRPM | TEMPERATURE: 98.1 F | OXYGEN SATURATION: 99 % | DIASTOLIC BLOOD PRESSURE: 71 MMHG | WEIGHT: 160 LBS

## 2017-11-27 DIAGNOSIS — T85.518A CHOLECYSTOSTOMY TUBE DYSFUNCTION: Primary | ICD-10-CM

## 2017-11-27 PROCEDURE — 99282 EMERGENCY DEPT VISIT SF MDM: CPT

## 2017-11-28 ENCOUNTER — HOSPITAL ENCOUNTER (OUTPATIENT)
Dept: RADIOLOGY | Facility: HOSPITAL | Age: 27
Discharge: HOME | End: 2017-11-28
Attending: SURGERY | Admitting: RADIOLOGY

## 2017-11-28 DIAGNOSIS — K81.9 CHOLECYSTITIS: ICD-10-CM

## 2017-11-28 PROCEDURE — C1769 GUIDE WIRE: HCPCS

## 2017-11-28 PROCEDURE — 47536 EXCHANGE BILIARY DRG CATH: CPT

## 2017-11-28 PROCEDURE — C1729 CATH, DRAINAGE: HCPCS

## 2017-11-28 RX ADMIN — IOHEXOL 10 ML: 300 INJECTION, SOLUTION INTRAVENOUS at 16:53

## 2017-11-28 NOTE — DISCHARGE INSTRUCTIONS
How to Care for Your Chest or Abdominal Catheter   WHAT YOU NEED TO KNOW:   A chest or abdominal catheter helps remove fluid from your chest or abdomen  This may decrease symptoms such as shortness of breath, pain, or nausea  One end of the catheter sits inside your abdomen or chest  The other end sits outside of your body  Your healthcare provider will show you or your caregiver how to drain fluid through the catheter  DISCHARGE INSTRUCTIONS:   Call 911 or have someone else call for any of the following:   · You have trouble breathing  · You faint or lose consciousness when you drain your catheter  Return to the emergency department if:   · Your catheter comes out  · You feel weak, dizzy, or faint  · You have severe pain or shortness of breath when you drain fluid or after you stop draining  Contact your healthcare provider if:   · Your symptoms, such as pain or shortness of breath, do not get better after you drain fluid  · You have redness, pain, or pus where the catheter is located  · You have a fever  · You cannot drain fluid  · You see a change in the color of fluid that you drain  · You see fluid leaking from around your catheter  · You drain foul-smelling fluid or bloody fluid from your catheter  · You see a hole or tear in your catheter and need to use the emergency clip  · You have questions or concerns about your condition or care  How often you should drain fluid:  Your healthcare provider will tell you how often to drain fluid  He may tell you to follow a schedule, such as draining once a day or once every other day  He may instead tell you to drain fluid when you have symptoms such as pain  Before you drain fluid from your catheter:   · Wash your hands  Remove all rings  Use soap and water or an alcohol-based hand rub  This will help prevent an infection  · Gather your supplies  Get a drainage kit and place it on a firm, clean surface  Drainage kits contain either a 500 mL or 1000 mL bottle and a procedure kit  Your healthcare provider will tell you which bottle to use  · Gently remove the old bandage  Do not pull on the drain when you remove the bandage  Throw the bandage away  · Wash your hands a second time  Use soap and water or an alcohol-based hand rub  · Open the drainage kit and remove the procedure kit  Remove the bandage and place it on your clean surface  Leave the bottle with drainage tubing in the package  Remove the procedure kit and place it on your clean surface with the folded side facing up  Carefully unfold the corners of the procedure kit  Do not touch anything inside of the procedure kit  Everything inside of the procedure kit is sterile  · Prepare the drainage tubing and bottle  Uncoil the drainage tubing that is connected to the bottle  Do not touch the end of the drainage tubing  Do not remove the cover from the end of the drainage tubing  Lay the drainage tubing on the procedure kit  · Put on gloves  Both gloves fit either hand   each glove up by the folded part and put them on  Do not touch any part of the outside of the gloves with your bare hand  Do not let them touch anything that is not sterile  · Open the smaller packages inside of the procedure kit  Open the package that contains the new catheter cap  Let the cap gently fall onto the procedure kit  Tear open the alcohol pads, but do not remove them from their pouches  · Squeeze or roll the clamp closed on the drainage tubing  If the clamp rolls, roll it towards the bottle  · Remove the cap on the end of your catheter  Hold the catheter close to the cap  Twist the cap counter clockwise and pull it off gently  Throw the cap away  Do not let the end of the catheter touch anything  · Clean the end of the catheter  Use 1 alcohol pad from the procedure kit  Wipe around the end of the catheter in circles   Do not put anything into the end of the catheter to clean it  This could damage the catheter  How to drain fluid from your catheter:   · Connect the end of your catheter to the drainage tubing  Remove the cover from the end of the drainage tubing  Hold the end of your catheter in one hand and the drainage tubing in your other hand  Insert the drainage tubing into your catheter until you hear or feel a click  · Remove the lock clip on the bottle  Twist and pull gently to remove it  · Push down on the bottle's plunger  Use 1 hand to hold the bottle steady  Push down gently on the T-shaped plunger at the top of the bottle  This will create a vacuum inside the bottle and help drain fluid  Keep the bottle on a firm surface  · Open the clamp on the drainage tubing  This will start the flow of fluid  · Drain as much fluid as directed  Do not drain more than 1000 mL of fluid from a chest catheter  Do not remove more than 2000 mL of fluid from an abdominal catheter  To make the fluid drain slower, roll the clamp toward the bottle or gently squeeze the clamp  To make the fluid drain faster, slide the clamp away from the bottle or slowly release the clamp  It is normal to feel pain or cough when fluid is drained  To decrease pain or coughing, slow down the flow of fluid  You can also close the clamp and take a break  If your symptoms do not get better when you stop draining, do not continue to drain fluid  · Change the bottle when it is full  If you need to use a second bottle, close the clamp on the drainage tubing  This will stop fluid from draining  Hold the end of your catheter  Pull out the end of the drainage tubing from your catheter  Do not let the end of your catheter touch anything  Remove the cap on the end of the new drainage tubing  Insert the drainage tubing into your catheter  Remove the support clip on the bottle  Push down on the bottle plunger  Open the clamp on the drainage tubing   Continue to remove as much fluid as directed  · Close the clamp on the drainage tubing to stop fluid from draining  Check that the clamp is completely closed  · Pull out the end of the drainage tubing from your catheter  Do not let the end of your catheter touch anything  · Clean the end of your catheter with a new alcohol pad  This will help prevent infection  · Place the new cap over the end of your catheter  Twist it clockwise until you hear or feel a click  ·        After you drain fluid from your catheter:   · Clean the skin around your catheter with a new alcohol pad  Start closest to where the catheter is inserted in your skin  Move the alcohol pad in circles away from your catheter  · Place the foam pad around your catheter  The foam pad should have a small cut in it  This cut lets you fit the foam pad around your catheter  · Loop the end of the catheter  Place the looped catheter on top of the foam pad  Hold it on top of the foam pad  Place the gauze from your procedure kit over the catheter  Make sure the catheter is completely covered by the gauze  · Remove your gloves  This will make it easier to handle the clear sticky bandage  · Place the sticky bandage over the gauze  There are 3 layers you need to remove from the bandage  Remove the larger white layer from the bandage  Place the bandage over your gauze so the gauze is in the center of the bandage  Hold one corner of the bandage and remove the larger clear layer of the bandage  Remove the last white layer of the bandage  Press gently on all corners of the bandage to help it stick to your skin  · Write down the amount of fluid you drained  There are measurements on the side of the bottle  Also write down the color of the fluid, the date, and the time  Bring this record with you to your follow-up visits  · Empty the fluid from the bottle  Hold the bottle steady with one hand   Push down on the plunger at the top of the bottle  Move the plunger in circles  Open the clamp on the drainage tubing for 10 seconds and then close it  Pull back the plunger  Push sideways and down on the side of the bottle cap to loosen it  Remove the drainage tubing from the bottle  Empty the bottle of fluid into the toilet  · Place the drainage tubing and bottle in a sealable plastic bag  Throw the bag away with your regular garbage  Never recycle the plastic bottle  Other important information:   · If your catheter comes out, cover the opening in your skin with sterile gauze and a bandage  Use the sterile gauze and bandage from your drainage kit  · Do not take a bath or swim in hot tubs or pools  This can cause an infection  · You can shower or take a sponge bath  Make sure your bandage covers your catheter before you bathe  The edges of the bandage should make contact with your skin on all sides  This will prevent water from getting into your drain  If your bandage gets wet, remove the bandage  Clean your skin around the catheter and replace the bandage as directed  Follow up with your healthcare provider as directed:  Write down your questions so you remember to ask them during your visits  © 2017 [address] [facility] Information is for End User's use only and may not be sold, redistributed or otherwise used for commercial purposes  All illustrations and images included in CareNotes® are the copyrighted property of [email or web address]  or Hitesh Lyon  The above information is an  only  It is not intended as medical advice for individual conditions or treatments  Talk to your doctor, nurse or pharmacist before following any medical regimen to see if it is safe and effective for you

## 2017-11-28 NOTE — DISCHARGE INSTRUCTIONS
TUBE CARE INSTRUCTIONS    Care after your procedure:    Resume your normal diet  Small sips of flat soda will help with nausea  1  The properly functioning catheter should be forward flushed once (1x) daily with 10ml of normal saline using clean technique  You will be given a prescription for flushes  To flush the tube, clean both connections with alcohol swab  Twist off the drainage bag/ bulb  tubing and twist the saline syringe into the drainage tube and flush  Remove the syringe and twist the drainage bag / bulb tubing tubing back on     2  The drainage bag/bulb may be emptied as necessary  Keep a record of the amount of fluid you drain from your tube  This should be done with clean technique as well  3  A fresh dressing should be applied daily over the tube insertion site  4  As the tube is secured to the skin with only a suture,try not to pull on your tube  Tub baths are not permitted  Showers are permitted if the patient's skin entry site is prevented from getting wet  Similarly, washcloth "baths" are acceptable  Contact Interventional Radiology at [phone] [place] PATIENTS: Contact Interventional Radiology at [phone]) Jacques Slaughter PATIENTS: Contact Interventional Radiology at [phone]) if:    1  Leakage or large amounts of liquid around the catheter  2  Fever of 101 degrees lasting several hours without other obvious cause (such as sore throat, flu, etc)  3  Persistent nausea or vomiting  4  Diminished drainage, which may be associated with pressure or pain  5  Catheter pulled back or falls out  The following pharmacies carry the flush syringes         [facility]                     [facility]  [address] [facility]                         [phone]  [facility]  Phone [phone]            Phone [phone] [address] [facility]                                [phone]  [address] [place] [address] [place] Soham ZAVALA                      [address] [place]  Phone [phone]            Phone [phone]                      Juancarlos Piña                                                                                                          [phone]  [facility]  [facility] [address]    [address]  Phone [phone]

## 2017-11-28 NOTE — ED PROVIDER NOTES
History  Chief Complaint   Patient presents with    Wound Drain Evaluation     Pt has a penrose drain in her gallbladder for an infection  for 3 weeks  Developed new pain at site yesterday  Today when she flushed it with saline it leaked out around the insertion site  This 58-year-old female status post acute cholelithiasis with coli cystostomy tube placed November 6 complains of right upper quadrant discomfort for 2 days  She felt there is less drainage than usual today so she attempted to flush the cholecystostomy tube  She states when she did that fluid leaked out from around the tube  She has had slight drainage into the SAM drains since then  She states there has been no increase in her abdominal pain tonight compared to yesterday  Patient states she called interventional Radiology and spoke to someone who is last name is Nilo Radha  She states they told her to come to the emergency room to evaluate this  Patient denies fever, dyspnea, nausea, vomiting or change in bowel movements  She again states that she has had more abdominal pain over the last 2 days than usual             Prior to Admission Medications   Prescriptions Last Dose Informant Patient Reported? Taking? Naltrexone (VIVITROL IM)   Yes No   Sig: Inject 300 mg into the shoulder, thigh, or buttocks   ibuprofen (MOTRIN) 600 mg tablet   No No   Sig: Take 1 tablet by mouth every 6 (six) hours as needed for mild pain or moderate pain      Facility-Administered Medications: None       Past Medical History:   Diagnosis Date    Disease of thyroid gland     hypoactive    Hepatitis C     Sciatica        Past Surgical History:   Procedure Laterality Date    KNEE ARTHROSCOPY W/ ACL RECONSTRUCTION Right        History reviewed  No pertinent family history  I have reviewed and agree with the history as documented      Social History   Substance Use Topics    Smoking status: Current Every Day Smoker    Smokeless tobacco: Never Used   iRates Alcohol use No        Review of Systems   Constitutional: Negative  HENT: Negative  Eyes: Negative  Respiratory: Negative  Cardiovascular: Negative  Gastrointestinal: Negative  Abdominal pain:   See HPI  Endocrine: Negative  Genitourinary: Negative  Musculoskeletal: Negative  Skin: Negative  Allergic/Immunologic: Negative  Neurological: Negative  Hematological: Negative  Psychiatric/Behavioral: Negative  All other systems reviewed and are negative  Physical Exam  ED Triage Vitals   Temperature Pulse Respirations Blood Pressure SpO2   11/27/17 2149 11/27/17 2145 11/27/17 2149 11/27/17 2145 11/27/17 2145   97 6 °F (36 4 °C) 86 16 112/67 99 %      Temp Source Heart Rate Source Patient Position - Orthostatic VS BP Location FiO2 (%)   11/27/17 2149 11/27/17 2149 11/27/17 2149 11/27/17 2149 --   Tympanic Monitor Sitting Right arm       Pain Score       11/27/17 2149       6           Orthostatic Vital Signs  Vitals:    11/27/17 2145 11/27/17 2149 11/27/17 2200 11/27/17 2215   BP: 112/67 114/68 106/59 101/61   Pulse: 86 87 84 84   Patient Position - Orthostatic VS:  Sitting         Physical Exam   Constitutional: She is oriented to person, place, and time  She appears well-developed and well-nourished  HENT:   Head: Normocephalic and atraumatic  Eyes: Conjunctivae and EOM are normal  Pupils are equal, round, and reactive to light  Neck: Normal range of motion  Neck supple  Cardiovascular: Normal rate and regular rhythm  No murmur heard  Pulmonary/Chest: Effort normal and breath sounds normal    Abdominal: Soft  Bowel sounds are normal  She exhibits no distension and no mass  Tenderness:  mild generalized abdominal tenderness  There is no rebound and no guarding  No hernia  Musculoskeletal: Normal range of motion  She exhibits no edema  Neurological: She is alert and oriented to person, place, and time  She has normal reflexes  No cranial nerve deficit  Coordination normal    Skin: Skin is warm and dry  Capillary refill takes less than 2 seconds  No rash noted  Psychiatric: She has a normal mood and affect  Nursing note and vitals reviewed  ED Medications  Medications - No data to display    Diagnostic Studies  Results Reviewed     None                 No orders to display              Procedures  Procedures       Phone Contacts  ED Phone Contact    ED Course  ED Course                                MDM  Number of Diagnoses or Management Options  Cholecystostomy tube dysfunction: new and does not require workup  Diagnosis management comments:  Stable exam   No peritoneal signs  No jaundice  Case discussed with on-call radiologist as well as the on-call surgeon  Both advise discharging patient for follow up with IR tomorrow       Amount and/or Complexity of Data Reviewed  Discuss the patient with other providers: yes      CritCare Time    Disposition  Final diagnoses:   Cholecystostomy tube dysfunction     Time reflects when diagnosis was documented in both MDM as applicable and the Disposition within this note     Time User Action Codes Description Comment    11/27/2017 10:45 PM Kathy Mo Add [T94 [id number]] Cholecystostomy tube dysfunction       ED Disposition     ED Disposition Condition Comment    Discharge  Paola Scott discharge to home/self care  Condition at discharge: Stable        Follow-up Information     Follow up With Specialties Details Why Contact Info     interventional radiology (IR)  Call in 1 day  tell them we feel you need the tube checked before Thursday         Patient's Medications   Discharge Prescriptions    No medications on file     No discharge procedures on file      ED Provider  Electronically Signed by           Arin Perry DO  11/27/17 9476

## 2017-12-14 ENCOUNTER — GENERIC CONVERSION - ENCOUNTER (OUTPATIENT)
Dept: OTHER | Facility: OTHER | Age: 27
End: 2017-12-14

## 2017-12-14 ENCOUNTER — HOSPITAL ENCOUNTER (OUTPATIENT)
Dept: RADIOLOGY | Facility: HOSPITAL | Age: 27
Discharge: HOME | End: 2017-12-14
Attending: SURGERY

## 2017-12-14 DIAGNOSIS — K82.9 GALL BLADDER DISEASE: ICD-10-CM

## 2017-12-14 PROCEDURE — C1729 CATH, DRAINAGE: HCPCS

## 2017-12-14 PROCEDURE — 47536 EXCHANGE BILIARY DRG CATH: CPT

## 2017-12-14 PROCEDURE — C1769 GUIDE WIRE: HCPCS

## 2017-12-14 RX ADMIN — IOHEXOL 10 ML: 300 INJECTION, SOLUTION INTRAVENOUS at 12:10

## 2017-12-14 NOTE — DISCHARGE INSTRUCTIONS
TUBE CARE INSTRUCTIONS    Care after your procedure:    Resume your normal diet  Small sips of flat soda will help with nausea  1  The properly functioning catheter should be forward flushed once (1x) daily with 10ml of normal saline using clean technique  You will be given a prescription for flushes  To flush the tube, clean both connections with alcohol swab  Twist off the drainage bag/ bulb  tubing and twist the saline syringe into the drainage tube and flush  Remove the syringe and twist the drainage bag / bulb tubing tubing back on     2  The drainage bag/bulb may be emptied as necessary  Keep a record of the amount of fluid you drain from your tube  This should be done with clean technique as well  3  A fresh dressing should be applied daily over the tube insertion site  4  As the tube is secured to the skin with only a suture,try not to pull on your tube  Tub baths are not permitted  Showers are permitted if the patient's skin entry site is prevented from getting wet  Similarly, washcloth "baths" are acceptable  Contact Interventional Radiology at [phone] [facility] PATIENTS: Contact Interventional Radiology at [phone]) Ranjan Peri PATIENTS: Contact Interventional Radiology at [phone]) if:    1  Leakage or large amounts of liquid around the catheter  2  Fever of 101 degrees lasting several hours without other obvious cause (such as sore throat, flu, etc)  3  Persistent nausea or vomiting  4  Diminished drainage, which may be associated with pressure or pain  Or when the     drainage from your tube is less than 10mls for 48 hours  5  Catheter pulled back or falls out  The following pharmacies carry the flush syringes         [facility]                     [facility]  [address] [facility]                         [address] [facility]  Phone [phone]            Phone [phone]   [place] [address]                                [phone]  [address] [facility] Sophia ZAVALA                      [address] [place]  Phone [phone]            Phone [phone]                      Paul Dunn                                                                                                          [phone]  [facility]  [facility] [address]    [address]  Phone [phone]

## 2017-12-14 NOTE — SEDATION DOCUMENTATION
choley tube check, tube exchanged by Dr Regina Fishre, no complications, pt instructed to contact surgeon to schedule gallbladder removal, 3 month tube check scheduled, discharged ambulatory

## 2017-12-14 NOTE — BRIEF OP NOTE (RAD/CATH)
INTERVENTIONAL RADIOLOGY PROCEDURE NOTE    Preoperative diagnosis:  Partially dislodged cholecystostomy tube    Postoperative diagnosis: Same    Procedure:  Cholecystostomy tube exchange and repositioning    Surgeon: Erin Bedoya MD     Assistant: None  No qualified resident was available  Blood loss:  Trace    Specimens:  None    Findings:  Successful exchange and repositioning of 8 [place] cholecystostomy tube into the gallbladder  Multiple large stones are noted  There is persistent cystic duct obstruction      Complications:  None immediate    Anesthesia:  1% lidocaine

## 2018-01-09 NOTE — PRE-PROCEDURE INSTRUCTIONS
Pre-Surgery Instructions:   Medication Instructions    ibuprofen (MOTRIN) 600 mg tablet Instructed patient per Anesthesia Guidelines   Naltrexone (VIVITROL IM) Instructed patient per Anesthesia Guidelines  Pre op instructions and bathing instructions given   Has hibiclens

## 2018-01-10 ENCOUNTER — ANESTHESIA EVENT (OUTPATIENT)
Dept: PERIOP | Facility: HOSPITAL | Age: 28
End: 2018-01-10
Payer: COMMERCIAL

## 2018-01-11 ENCOUNTER — HOSPITAL ENCOUNTER (OUTPATIENT)
Facility: HOSPITAL | Age: 28
Setting detail: OUTPATIENT SURGERY
Discharge: HOME | End: 2018-01-11
Attending: SURGERY | Admitting: SURGERY
Payer: COMMERCIAL

## 2018-01-11 ENCOUNTER — ANESTHESIA (OUTPATIENT)
Dept: PERIOP | Facility: HOSPITAL | Age: 28
End: 2018-01-11
Payer: COMMERCIAL

## 2018-01-11 VITALS
RESPIRATION RATE: 18 BRPM | HEIGHT: 65 IN | SYSTOLIC BLOOD PRESSURE: 104 MMHG | BODY MASS INDEX: 24.66 KG/M2 | TEMPERATURE: 99.8 F | DIASTOLIC BLOOD PRESSURE: 73 MMHG | OXYGEN SATURATION: 98 % | WEIGHT: 148 LBS | HEART RATE: 101 BPM

## 2018-01-11 DIAGNOSIS — K80.10 CALCULOUS CHOLECYSTITIS: ICD-10-CM

## 2018-01-11 LAB — EXT PREGNANCY TEST URINE: NEGATIVE

## 2018-01-11 PROCEDURE — 81025 URINE PREGNANCY TEST: CPT | Performed by: STUDENT IN AN ORGANIZED HEALTH CARE EDUCATION/TRAINING PROGRAM

## 2018-01-11 PROCEDURE — 88304 TISSUE EXAM BY PATHOLOGIST: CPT | Performed by: SURGERY

## 2018-01-11 RX ORDER — LIDOCAINE HYDROCHLORIDE 10 MG/ML
INJECTION, SOLUTION INFILTRATION; PERINEURAL AS NEEDED
Status: DISCONTINUED | OUTPATIENT
Start: 2018-01-11 | End: 2018-01-11 | Stop reason: SURG

## 2018-01-11 RX ORDER — ROCURONIUM BROMIDE 10 MG/ML
INJECTION, SOLUTION INTRAVENOUS AS NEEDED
Status: DISCONTINUED | OUTPATIENT
Start: 2018-01-11 | End: 2018-01-11 | Stop reason: SURG

## 2018-01-11 RX ORDER — ONDANSETRON 2 MG/ML
4 INJECTION INTRAMUSCULAR; INTRAVENOUS EVERY 4 HOURS PRN
Status: DISCONTINUED | OUTPATIENT
Start: 2018-01-11 | End: 2018-01-11 | Stop reason: HOSPADM

## 2018-01-11 RX ORDER — SODIUM CHLORIDE 9 MG/ML
125 INJECTION, SOLUTION INTRAVENOUS CONTINUOUS
Status: DISCONTINUED | OUTPATIENT
Start: 2018-01-11 | End: 2018-01-11 | Stop reason: HOSPADM

## 2018-01-11 RX ORDER — MORPHINE SULFATE 2 MG/ML
2 INJECTION, SOLUTION INTRAMUSCULAR; INTRAVENOUS
Status: DISCONTINUED | OUTPATIENT
Start: 2018-01-11 | End: 2018-01-11 | Stop reason: HOSPADM

## 2018-01-11 RX ORDER — PREGABALIN 75 MG/1
150 CAPSULE ORAL ONCE
Status: COMPLETED | OUTPATIENT
Start: 2018-01-11 | End: 2018-01-11

## 2018-01-11 RX ORDER — ONDANSETRON 2 MG/ML
INJECTION INTRAMUSCULAR; INTRAVENOUS AS NEEDED
Status: DISCONTINUED | OUTPATIENT
Start: 2018-01-11 | End: 2018-01-11 | Stop reason: SURG

## 2018-01-11 RX ORDER — METOCLOPRAMIDE HYDROCHLORIDE 5 MG/ML
10 INJECTION INTRAMUSCULAR; INTRAVENOUS ONCE AS NEEDED
Status: DISCONTINUED | OUTPATIENT
Start: 2018-01-11 | End: 2018-01-11 | Stop reason: HOSPADM

## 2018-01-11 RX ORDER — SODIUM CHLORIDE, SODIUM LACTATE, POTASSIUM CHLORIDE, CALCIUM CHLORIDE 600; 310; 30; 20 MG/100ML; MG/100ML; MG/100ML; MG/100ML
125 INJECTION, SOLUTION INTRAVENOUS CONTINUOUS
Status: DISCONTINUED | OUTPATIENT
Start: 2018-01-11 | End: 2018-01-11 | Stop reason: HOSPADM

## 2018-01-11 RX ORDER — ONDANSETRON 2 MG/ML
4 INJECTION INTRAMUSCULAR; INTRAVENOUS ONCE AS NEEDED
Status: DISCONTINUED | OUTPATIENT
Start: 2018-01-11 | End: 2018-01-11 | Stop reason: HOSPADM

## 2018-01-11 RX ORDER — ACETAMINOPHEN 325 MG/1
975 TABLET ORAL ONCE
Status: COMPLETED | OUTPATIENT
Start: 2018-01-11 | End: 2018-01-11

## 2018-01-11 RX ORDER — MIDAZOLAM HYDROCHLORIDE 1 MG/ML
INJECTION INTRAMUSCULAR; INTRAVENOUS AS NEEDED
Status: DISCONTINUED | OUTPATIENT
Start: 2018-01-11 | End: 2018-01-11 | Stop reason: SURG

## 2018-01-11 RX ORDER — KETOROLAC TROMETHAMINE 30 MG/ML
INJECTION, SOLUTION INTRAMUSCULAR; INTRAVENOUS AS NEEDED
Status: DISCONTINUED | OUTPATIENT
Start: 2018-01-11 | End: 2018-01-11 | Stop reason: SURG

## 2018-01-11 RX ORDER — CLINDAMYCIN PHOSPHATE 900 MG/50ML
900 INJECTION INTRAVENOUS
Status: COMPLETED | OUTPATIENT
Start: 2018-01-11 | End: 2018-01-11

## 2018-01-11 RX ORDER — IBUPROFEN 600 MG/1
600 TABLET ORAL ONCE
Status: COMPLETED | OUTPATIENT
Start: 2018-01-11 | End: 2018-01-11

## 2018-01-11 RX ORDER — GLYCOPYRROLATE 0.2 MG/ML
INJECTION INTRAMUSCULAR; INTRAVENOUS AS NEEDED
Status: DISCONTINUED | OUTPATIENT
Start: 2018-01-11 | End: 2018-01-11 | Stop reason: SURG

## 2018-01-11 RX ORDER — PROPOFOL 10 MG/ML
INJECTION, EMULSION INTRAVENOUS AS NEEDED
Status: DISCONTINUED | OUTPATIENT
Start: 2018-01-11 | End: 2018-01-11 | Stop reason: SURG

## 2018-01-11 RX ORDER — KETAMINE HYDROCHLORIDE 50 MG/ML
INJECTION, SOLUTION, CONCENTRATE INTRAMUSCULAR; INTRAVENOUS AS NEEDED
Status: DISCONTINUED | OUTPATIENT
Start: 2018-01-11 | End: 2018-01-11 | Stop reason: SURG

## 2018-01-11 RX ORDER — BUPIVACAINE HYDROCHLORIDE AND EPINEPHRINE 2.5; 5 MG/ML; UG/ML
INJECTION, SOLUTION EPIDURAL; INFILTRATION; INTRACAUDAL; PERINEURAL AS NEEDED
Status: DISCONTINUED | OUTPATIENT
Start: 2018-01-11 | End: 2018-01-11 | Stop reason: HOSPADM

## 2018-01-11 RX ADMIN — ACETAMINOPHEN 975 MG: 325 TABLET ORAL at 17:08

## 2018-01-11 RX ADMIN — IBUPROFEN 600 MG: 600 TABLET ORAL at 17:08

## 2018-01-11 RX ADMIN — ROCURONIUM BROMIDE 40 MG: 10 INJECTION INTRAVENOUS at 12:51

## 2018-01-11 RX ADMIN — KETAMINE HYDROCHLORIDE 40 MG: 50 INJECTION INTRAMUSCULAR; INTRAVENOUS at 12:51

## 2018-01-11 RX ADMIN — DEXAMETHASONE SODIUM PHOSPHATE 10 MG: 10 INJECTION INTRAMUSCULAR; INTRAVENOUS at 12:51

## 2018-01-11 RX ADMIN — PROPOFOL 200 MG: 10 INJECTION, EMULSION INTRAVENOUS at 12:51

## 2018-01-11 RX ADMIN — MIDAZOLAM HYDROCHLORIDE 2 MG: 1 INJECTION, SOLUTION INTRAMUSCULAR; INTRAVENOUS at 12:51

## 2018-01-11 RX ADMIN — GLYCOPYRROLATE 0.4 MG: 0.2 INJECTION, SOLUTION INTRAMUSCULAR; INTRAVENOUS at 13:34

## 2018-01-11 RX ADMIN — ONDANSETRON 4 MG: 2 INJECTION INTRAMUSCULAR; INTRAVENOUS at 14:30

## 2018-01-11 RX ADMIN — KETOROLAC TROMETHAMINE 30 MG: 30 INJECTION, SOLUTION INTRAMUSCULAR at 13:32

## 2018-01-11 RX ADMIN — ONDANSETRON 4 MG: 2 INJECTION INTRAMUSCULAR; INTRAVENOUS at 16:46

## 2018-01-11 RX ADMIN — LIDOCAINE HYDROCHLORIDE 50 MG: 10 INJECTION, SOLUTION INFILTRATION; PERINEURAL at 12:51

## 2018-01-11 RX ADMIN — SODIUM CHLORIDE: 0.9 INJECTION, SOLUTION INTRAVENOUS at 12:48

## 2018-01-11 RX ADMIN — ONDANSETRON 4 MG: 2 INJECTION INTRAMUSCULAR; INTRAVENOUS at 13:32

## 2018-01-11 RX ADMIN — NEOSTIGMINE METHYLSULFATE 3 MG: 1 INJECTION, SOLUTION INTRAMUSCULAR; INTRAVENOUS; SUBCUTANEOUS at 13:34

## 2018-01-11 RX ADMIN — PREGABALIN 150 MG: 75 CAPSULE ORAL at 12:25

## 2018-01-11 RX ADMIN — ACETAMINOPHEN 975 MG: 325 TABLET ORAL at 12:25

## 2018-01-11 RX ADMIN — CLINDAMYCIN PHOSPHATE 900 MG: 18 INJECTION, SOLUTION INTRAMUSCULAR; INTRAVENOUS at 12:55

## 2018-01-11 NOTE — PROGRESS NOTES
Patient reports pain as a 9/10  Spoke to anesthesia about pain management options  Patient can receive 975 mg of tylenol at 4:30 pm   The patient was informed of her medication options and agreed to tylenol

## 2018-01-11 NOTE — DISCHARGE INSTRUCTIONS
Laparoscopic Cholecystectomy    WHAT YOU SHOULD KNOW:    Cholecystitis is inflammation of your gallbladder  Your gallbladder stores bile, which helps break down the fat that you eat  It also helps remove certain chemicals from your body  You may have a sudden, severe attack (acute cholecystitis) or several mild attacks (chronic cholecystitis)  Laparoscopic cholecystectomy is surgery to remove your gallbladder  During this surgery, small incisions are made in your abdomen  A small scope and special tools are inserted through these incisions  Your gallbladder is removed from it normal location and taken out of your abdomen  The incisions are closed with sutures and a small amount of glue is applied over top incisions to help reinforce the incisions to optimize healing          AFTER YOU LEAVE:  Following discharge from the hospital, you may have some questions about your procedure, your activities or your general condition  These instructions may answer some of your questions and help you adjust during the first few days following your operation  You can expect to be sore and tender mostly around the incisions  This pain should last approximally 5 days and gradually improve daily  Incisions: Your doctor may have chosen to use a type of adhesive glue, to close your incision  The glue is used to cover the incision, assist in closure, and prevent contamination so to optimize healing  This adhesive glue will darken and may appear as a purple film over the incision  Do not pick at the glue, it will peel away on its own within one to two weeks  You may apply ice to the incisions to help with pain  Avoid heat as this my make the glue tacky   It is normal to have some bruising, swelling or mild discoloration around the incision  If increasing redness or pain develops, call our office immediately  You may wash the incision gently with soap and water then pat dry  Do not apply any creams or ointments  Dressings: You do not usually need to keep incisions covered with a dressing  A dressing is only required if you had a drain following your surgery and the drain was removed prior to discharge  If a dressing is required the doctor will discuss the dressing with prior to leaving  You may remove the dressing for showering, but reapply when dry  May apply a Band-Aid as needed over year old drain site  Bathing: You may shower daily with soap and water the day after the procedure  It is OK to GENTLY wash the incision with soap and water then pat dry  Do NOT soak incision in a tub, pool, or hot tub for 2 weeks  Diet: Eat low-fat foods for 4 to 6 weeks while your body learns to digest fat without a gallbladder  Slowly increase the amount of fat that you eat  We recommend you slowly advance your diet  Try to start with softer bland foods and gradually advance as tolerated  Be sure to consume plenty of water  Avoid alcohol  Activity/Restrictions: The evening following the procedure you should rest as much as possible, sitting, lying or reclining  you should be sure someone remains with you until the next morning  Gradually increase your activity daily  Walking 3-4 daily is good and  stairs are ok  Listen to your body  If you start to get tired or sore then rest    No strenuous activity or exercise for 3-4 weeks  No driving for 5 days or while taking narcotics for pain  Return or work: You may return to work or other activities as soon as your pain is controlled and you feel comfortable  For many people, this is 5 to 7 days after surgery  If your job requires heavy lifting you will need to be on light duty for 2-3 weeks  Follow up appointment: following discharge from the hospital call the office in 1-2 days to set up a post operative appointment to be seen in 2-3 weeks  The phone number and address should be provided in your discharge paper work      Medication: Please take all medications as prescribed  Call your healthcare provider if you think your medicine is not helping or if you have side effects  If you were given a prescription for Percocet, Norco, or Vicodin for pain be sure to eat prior to taking as these medications as they may cause nausea and vomiting on an empty stomach  DO NOT take Tylenol with these medication for a fever or for further pain control as these medications already contain Tylenol in them       May use Ibuprofen 2-3 tabs q4hrs as needed    Contact your healthcare provider if:   · You have a fever over 101°F (38°C) or chills  · You have pain or nausea that is not relieved by medicine  · You have redness and swelling around your incisions, or blood or pus is leaking from your incisions  · You are constipated or have diarrhea  · Your skin or eyes are yellow, or your bowel movements are pale  · You have questions or concerns about your surgery, condition, or care  Seek care immediately or call 911 if:   · You cannot stop vomiting  · Your bowel movements are black or bloody  · You have pain in your abdomen and it is swollen or hard  · Your arm or leg feels warm, tender, and painful  It may look swollen and red  · You feel lightheaded, short of breath, and have chest pain  · You cough up blood

## 2018-01-11 NOTE — PROGRESS NOTES
Spoke to Dr Silverio Clemons and Dr Gabby Barrera about pain management for Haydee Emmanuel  Both stated that the patient may have 975 mg of tylenol and 600 mg of ibuprofen now  Patient is also nauseas  Dr Silverio Clemons and Dr Gabby Barrera stated that patient is able to have another dose of zofran now  Patient received 4mg of zofran at 1430

## 2018-01-11 NOTE — ANESTHESIA POSTPROCEDURE EVALUATION
Post-Op Assessment Note      CV Status:  Stable    Mental Status:  Alert    Hydration Status:  Stable    PONV Controlled:  None    Airway Patency:  Patent        Staff: CRNA           /55 (01/11/18 1354)    Temp 97 7 °F (36 5 °C) (01/11/18 1354)    Pulse 89 (01/11/18 1354)   Resp      SpO2 100 % (01/11/18 1354)

## 2018-01-11 NOTE — ANESTHESIA PREPROCEDURE EVALUATION
Review of Systems/Medical History  Patient summary reviewed  Chart reviewed  No history of anesthetic complications     Cardiovascular  Negative cardio ROS Exercise tolerance: good,     Pulmonary  Smoker , ,        GI/Hepatic    Hepatitis (treated) C,   Comment: Calculous cholecystitis s/p perc drainage     Negative  ROS        Endo/Other  Negative endo/other ROS      GYN  Not currently pregnant ,          Hematology  Negative hematology ROS      Musculoskeletal  Negative musculoskeletal ROS        Neurology  Negative neurology ROS      Psychology     Chronic opioid dependence (hx opioid abuse, on vivitrol)         Physical Exam    Airway    Mallampati score: II  TM Distance: >3 FB  Neck ROM: full     Dental   No notable dental hx     Cardiovascular  Comment: Negative ROS,     Pulmonary      Other Findings      Lab Results   Component Value Date    WBC 12 32 (H) 11/17/2017    HGB 14 3 11/17/2017     11/17/2017     Lab Results   Component Value Date     11/07/2017    K 3 6 11/07/2017    BUN 4 (L) 11/07/2017    CREATININE 0 71 11/07/2017    GLUCOSE 78 11/17/2017     Anesthesia Plan  ASA Score- 2     Anesthesia Type- general with ASA Monitors  Additional Monitors:   Airway Plan: ETT  Comment: Pt on vivitrol for hx opiate abuse and requests no opiate analgesia  Adjuvant ketamine, APAP/lyrica preop, ketorolac planned  Plan Factors-Patient not instructed to abstain from smoking on day of procedure  Patient smoked on day of surgery  Induction- intravenous  Postoperative Plan-     Informed Consent- Anesthetic plan and risks discussed with patient and mother  I personally reviewed this patient with the CRNA  Discussed and agreed on the Anesthesia Plan with the CRNA  Gonzalo Keating

## 2018-01-11 NOTE — OP NOTE
OPERATIVE REPORT  PATIENT NAME: Vielka Bay    :  1990  MRN: [id number]  Pt Location: [facility] OR ROOM 02    SURGERY DATE: 2018    Surgeon(s) and Role:     * Emil Lama DO - Primary     * Robert Car MD - Assisting    Preop Diagnosis:  Calculous cholecystitis [K80 10]    Post-Op Diagnosis Codes:     * Calculous cholecystitis [K80 10]    Procedure(s) (LRB):  LAPAROSCOPIC CHOLECYSTECTOMY (N/A)    Specimen(s):  ID Type Source Tests Collected by Time Destination   1 : CC family/referring MD: No family MD listed Tissue Gallbladder TISSUE EXAM Emil Lama DO 2018 1329        Estimated Blood Loss:   Minimal    Drains:       Anesthesia Type:   General    Operative Indications:  Calculous cholecystitis [K80 10]      Operative Findings:  Previous cholecystostomy tube placed  Changes of chronic calculous cholecystitis noted  Multiple stones within the gallbladder    Review of Systems/Medical History  Patient summary reviewed  Chart reviewed  No history of anesthetic complications     Cardiovascular  Negative cardio ROS Exercise tolerance: good,   Pulmonary  Smoker , ,    GI/Hepatic  Hepatitis (treated) C,   Comment: Calculous cholecystitis s/p perc drainage   Negative  ROS    Endo/Other  Negative endo/other ROS  GYN  Not currently pregnant ,       Hematology  Negative hematology ROS     Musculoskeletal  Negative musculoskeletal ROS    Neurology  Negative neurology ROS     Psychology     Chronic opioid dependence (hx opioid abuse, on vivitrol)      One ASA 2 wound class 2  Height 65 inches weight 67 kg/148 lb, BMI 25    Complications:   None    Procedure and Technique:  Patient was brought the operative suite and identified by visualization, conversation, by armband  She was given clindamycin perioperatively  Once under anesthesia and placement of sequential compression pumps abdomen was prepped and draped in a sterile fashion  Previous cholecystostomy tube was prepped into the field  Time-out was performed and was assured that the prep was dry  Local was instilled at the infraumbilical fold  Small vertical skin incision was made subcutaneous tissues were bluntly dissected with Kocher clamps to the fascia fascia was lifted up and divided the midline carefully poked through the underlying peritoneum gaining access into the abdominal cavity  An 11 mm trocar was placed under direct visualization  CO2 was attached creating pneumoperitoneum 3 other 5 mm trocars were placed in the right upper quadrant  She had some omental attachments up to the gallbladder which were taken down with the Harmonic scalpel  Cholecystostomy tube was then removed  I then transected the tract which had developed attaching the gallbladder up to the anterior abdominal wall  Gallbladder was then lifted cephalad  Careful dissection was then carried out to identify the cystic duct and cystic artery  These were divided between Ligaclips  Gauze was were taken off the liver using variable speed Harmonic scalpel  There was good hemostasis  Irrigation was carried out  Gallbladder was brought to the umbilical port site  Trocars removed releasing pneumoperitoneum fascia umbilical site was closing 0 Vicryl in a figure-of-eight fashion x2 local was instilled irrigation is carried out 4 Monocryl was used to close skin incisions in a subcuticular fashion  Wounds washed and dried  Sterile histoacryl was applied  She was awakened in the operating returned to the recovery area in stable condition having tolerated procedure well     I was present for the entire procedure    Patient Disposition:  PACU     SIGNATURE: Nelsy Mac DO  DATE: January 11, 2018  TIME: 1:38 PM

## 2018-03-14 ENCOUNTER — TELEPHONE (OUTPATIENT)
Dept: RADIOLOGY | Facility: HOSPITAL | Age: 28
End: 2018-03-14

## (undated) DEVICE — GLOVE SRG BIOGEL ORTHOPEDIC 8

## (undated) DEVICE — ADHESIVE SKN CLSR HISTOACRYL FLEX 0.5ML LF

## (undated) DEVICE — NEEDLE 22 G X 1 1/2 SAFETY

## (undated) DEVICE — LIGHT HANDLE COVER SLEEVE DISP BLUE STELLAR

## (undated) DEVICE — ALLENTOWN LAP CHOLE APP PACK: Brand: CARDINAL HEALTH

## (undated) DEVICE — IRRIG ENDO FLO TUBING

## (undated) DEVICE — ENDOPATH XCEL BLADELESS TROCARS WITH STABILITY SLEEVES: Brand: ENDOPATH XCEL

## (undated) DEVICE — HARMONIC 1100 SHEARS, 36CM SHAFT LENGTH: Brand: HARMONIC

## (undated) DEVICE — PLASTIC ADHESIVE BANDAGE: Brand: CURITY

## (undated) DEVICE — SUT MONOCRYL 4-0 PS-2 27 IN Y426H

## (undated) DEVICE — INTENDED FOR TISSUE SEPARATION, AND OTHER PROCEDURES THAT REQUIRE A SHARP SURGICAL BLADE TO PUNCTURE OR CUT.: Brand: BARD-PARKER SAFETY BLADES SIZE 11, STERILE

## (undated) DEVICE — ENDOPATH XCEL UNIVERSAL TROCAR STABLILITY SLEEVES: Brand: ENDOPATH XCEL

## (undated) DEVICE — VIAL DECANTER

## (undated) DEVICE — TOWEL SET X-RAY

## (undated) DEVICE — COTTON TIP APPLICTOR 2 PK

## (undated) DEVICE — REM POLYHESIVE ADULT PATIENT RETURN ELECTRODE: Brand: VALLEYLAB

## (undated) DEVICE — CHLORAPREP HI-LITE 26ML ORANGE

## (undated) DEVICE — LIGAMAX 5 MM ENDOSCOPIC MULTIPLE CLIP APPLIER: Brand: LIGAMAX

## (undated) DEVICE — SUT VICRYL 0 UR-6 27 IN J603H